# Patient Record
Sex: MALE | Race: BLACK OR AFRICAN AMERICAN | NOT HISPANIC OR LATINO | Employment: UNEMPLOYED | URBAN - METROPOLITAN AREA
[De-identification: names, ages, dates, MRNs, and addresses within clinical notes are randomized per-mention and may not be internally consistent; named-entity substitution may affect disease eponyms.]

---

## 2017-02-06 PROCEDURE — 26750 TREAT FINGER FRACTURE EACH: CPT | Mod: F5

## 2017-02-06 PROCEDURE — 99283 EMERGENCY DEPT VISIT LOW MDM: CPT | Mod: 25

## 2017-02-07 ENCOUNTER — HOSPITAL ENCOUNTER (EMERGENCY)
Facility: HOSPITAL | Age: 27
Discharge: HOME | End: 2017-02-07
Attending: EMERGENCY MEDICINE

## 2017-02-07 VITALS
HEART RATE: 76 BPM | RESPIRATION RATE: 18 BRPM | WEIGHT: 167 LBS | TEMPERATURE: 98 F | OXYGEN SATURATION: 99 % | DIASTOLIC BLOOD PRESSURE: 78 MMHG | SYSTOLIC BLOOD PRESSURE: 125 MMHG

## 2017-02-07 DIAGNOSIS — M79.644 PAIN OF RIGHT THUMB: ICD-10-CM

## 2017-02-07 DIAGNOSIS — S62.524A CLOSED NONDISPLACED FRACTURE OF DISTAL PHALANX OF RIGHT THUMB, INITIAL ENCOUNTER: Primary | ICD-10-CM

## 2017-02-07 DIAGNOSIS — S60.011A CONTUSION OF RIGHT THUMB WITHOUT DAMAGE TO NAIL, INITIAL ENCOUNTER: ICD-10-CM

## 2017-02-07 PROCEDURE — 25000003 PHARM REV CODE 250: Performed by: NURSE PRACTITIONER

## 2017-02-07 RX ORDER — IBUPROFEN 200 MG
600 TABLET ORAL
Status: COMPLETED | OUTPATIENT
Start: 2017-02-07 | End: 2017-02-07

## 2017-02-07 RX ORDER — ACETAMINOPHEN AND CODEINE PHOSPHATE 300; 30 MG/1; MG/1
1-2 TABLET ORAL EVERY 6 HOURS PRN
Qty: 15 TABLET | Refills: 0 | Status: SHIPPED | OUTPATIENT
Start: 2017-02-07 | End: 2017-02-17

## 2017-02-07 RX ORDER — IBUPROFEN 600 MG/1
600 TABLET ORAL EVERY 6 HOURS PRN
Qty: 30 TABLET | Refills: 0 | Status: SHIPPED | OUTPATIENT
Start: 2017-02-07

## 2017-02-07 RX ADMIN — IBUPROFEN 600 MG: 200 TABLET, FILM COATED ORAL at 01:02

## 2017-02-07 NOTE — DISCHARGE INSTRUCTIONS
Finger Contusion  You have a contusion. This is also called a bruise. There is swelling and some bleeding under the skin, but no broken bones. This injury generally takes a few days to a few weeks to heal. During that time, the bruise will typically change in color from reddish, to purple-blue, to greenish-yellow, then to yellow-brown.  A finger contusion may be treated with a splint or nayla tape (taping the injured finger to the one next to it for support). Minor contusions likely will need no other treatment.  Home care  · Elevate the hand to reduce pain and swelling. As much as possible, sit or lie down with the hand raised about the level of your heart. This is especially important during the first 48 hours.  · Ice the finger to help reduce pain and swelling. Wrap a cold source (ice pack or ice cubes in a plastic bag) in a thin towel. Apply to the bruised finger for 20 minutes every 1 to 2 hours the first day. Continue this 3 to 4 times a day until the pain and swelling goes away.  · If nayla tape was applied and it becomes wet or dirty, change it. You may replace it with paper, plastic, or cloth tape. Before taping, put a thin strip of cotton or gauze between the fingers to absorb sweat.  · Unless another medication was prescribed, you can take acetaminophen, ibuprofen, or naproxen to control pain. (If you have chronic liver or kidney disease or ever had a stomach ulcer or GI bleeding, talk with your doctor before using these medicines.)  Follow up  Follow up with your healthcare provider or our staff as advised. Call if you are not improving within 1 to 2 weeks.  When to seek medical advice   Call your healthcare provider right away if you have any of the following:  · Increased pain or swelling  · Hand or arm becomes cold, blue, numb or tingly  · Signs of infection: Warmth, drainage, or increased redness or pain around the bruise  · Inability to move the injured finger or hand   · Frequent bruising for  unknown reasons            Closed Thumb Fracture  You have a broken (fractured) thumb. This causes local pain, swelling, and sometimes bruising. This injury will take about 4 to 6 weeks to heal. Thumb fractures may be treated with a splint or cast. This protects the thumb and holds the bone in place while it heals. More serious fractures may need surgery.    If the thumbnail has been severely injured, it may fall off in 1 to 2 weeks. A new thumbnail will usually start to grow back within a month.  Home care  Follow these guidelines when caring for yourself at home:  · Keep your arm elevated to reduce pain and swelling. When sitting or lying down elevate your arm above the level of your heart. You can do this by placing your arm on a pillow that rests on your chest or on a pillow at your side. This is most important during the first 2 days (48 hours) after the injury.  · Put an ice pack on the injured area. Do this for 20 minutes every 1 to 2 hours the first day for pain relief. You can make an ice pack by wrapping a plastic bag of ice cubes in a thin towel. As the ice melts, be careful that the cast or splint doesnt get wet. Continue using the ice pack 3 to 4 times a day for the next 2 days. Then use the ice pack as needed to ease pain and swelling.  · If a splint was put on, leave this in place for the time advised. This will keep the bones from moving out of position.  · Keep the cast or splint completely dry at all times. Bathe with your cast or splint out of the water. Protect it with a large plastic bag, rubber-banded at the top end. If a fiberglass cast or splint gets wet, you can dry it with a hair dryer.  · You may use acetaminophen or ibuprofen to control pain, unless another pain medicine was prescribed. If you have chronic liver or kidney disease, talk with your health care provider before using these medicines. Also talk with your provider if youve had a stomach ulcer or GI bleeding.  · Dont put  creams or objects under the cast if you have itching.  Follow-up care  Follow up with your health care provider in 1 week, or as advised. This is to make sure the bone is healing the way it should. Talk with your provider about when it is safe to return to sports or work.  If X-rays were taken, a radiologist will look at them. You will be told of any new findings that may affect your care.  When to seek medical advice  Call your health care provider right away if any of these occur:  · The cast cracks  · The plaster cast or splint becomes wet or soft  · The fiberglass cast or splint stays wet for more than 24 hours  · Bad odor from the cast or wound fluid stains the cast  · Pain or swelling gets worse  · Redness or warmth in the hand  · Fingers or hand become cold, blue, numb, or tingly  · You cant move your hand or fingers  · Skin around cast becomes red

## 2017-02-07 NOTE — ED PROVIDER NOTES
Encounter Date: 2/6/2017       History     Chief Complaint   Patient presents with    Hand Injury     pt reports closing R thumb in car door 2 days ago. still having pain and swelling     Review of patient's allergies indicates:  No Known Allergies    Patient is a 26 y.o. male presenting with the following complaint: hand injury. The history is provided by the patient.   Hand Injury    The incident occurred two days ago. The incident occurred at home. Injury mechanism: patient states that he slammed his right thumb into the car door two days ago. The pain is present in the right fingers (right thumb). The quality of the pain is described as aching and throbbing. The pain is at a severity of 7/10. The pain has been fluctuating since the incident. Pertinent negatives include no fever. The symptoms are aggravated by movement, use and palpation. He has tried rest for the symptoms. The treatment provided no relief.       PCP:  Alvino Jerez MD        History reviewed. No pertinent past medical history.  No past medical history pertinent negatives.  History reviewed. No pertinent past surgical history.  History reviewed. No pertinent family history.     Social History   Substance Use Topics    Smoking status: Current Every Day Smoker     Packs/day: 0.50    Smokeless tobacco: Never Used    Alcohol use No     Review of Systems   Constitutional: Negative for chills and fever.   HENT: Negative for congestion and sore throat.    Eyes: Negative for visual disturbance.   Respiratory: Negative for chest tightness and shortness of breath.    Cardiovascular: Negative for chest pain.   Gastrointestinal: Negative for abdominal pain, diarrhea, nausea and vomiting.   Genitourinary: Negative for dysuria.   Musculoskeletal: Negative for back pain and neck pain.        Positive for pain and swelling to right thumb.   Skin: Negative for rash and wound.   Neurological: Negative for dizziness, weakness, numbness and headaches.    Hematological: Does not bruise/bleed easily.       Physical Exam   Initial Vitals   BP Pulse Resp Temp SpO2   02/06/17 2325 02/06/17 2325 02/06/17 2325 02/06/17 2325 02/06/17 2325   128/84 80 20 98.1 °F (36.7 °C) 99 %     Physical Exam    Nursing note and vitals reviewed.  Constitutional: Vital signs are normal. He appears well-developed and well-nourished. He is cooperative. He does not appear ill. No distress.   HENT:   Head: Normocephalic and atraumatic.   Nose: Nose normal.   Mouth/Throat: Uvula is midline, oropharynx is clear and moist and mucous membranes are normal.   Eyes: Conjunctivae, EOM and lids are normal. Pupils are equal, round, and reactive to light.   Neck: Trachea normal and normal range of motion. Neck supple.   Cardiovascular: Normal rate, regular rhythm, intact distal pulses and normal pulses.   Pulmonary/Chest: Effort normal. No respiratory distress.   Musculoskeletal: Normal range of motion. He exhibits no edema.        Right hand: He exhibits tenderness, bony tenderness (to distal aspect of the right thumb) and swelling.        Hands:  Neurological: He is alert and oriented to person, place, and time. He has normal strength. No cranial nerve deficit or sensory deficit. GCS eye subscore is 4. GCS verbal subscore is 5. GCS motor subscore is 6.   Neurovascular intact to all extremities.    Skin: Skin is warm, dry and intact. Bruising (to distal aspect of the right thumb) noted. No rash noted.   Psychiatric: He has a normal mood and affect. His speech is normal and behavior is normal. Judgment and thought content normal. Cognition and memory are normal.         ED Course   Splint Application  Date/Time: 2/7/2017 1:45 AM  Performed by: MARY CARMEN PRYOR  Authorized by: MARY CRAMEN PRYOR   Consent Done: Yes  Consent: Verbal consent obtained.  Risks and benefits: risks, benefits and alternatives were discussed  Consent given by: patient  Patient understanding: patient states understanding  of the procedure being performed  Imaging studies: imaging studies available  Patient identity confirmed: SUDARSHAN, EMERSON, name and verbally with patient  Location details: right thumb  Splint type: static finger  Supplies used: aluminum splint  Post-procedure: The splinted body part was neurovascularly unchanged following the procedure.  Patient tolerance: Patient tolerated the procedure well with no immediate complications          ED Imaging Results:   Imaging Results         X-Ray Hand 3 view Right (Final result) Result time:  17 08:12:45    Final result by MALIK Ortiz Sr., MD (17 08:12:45)    Impression:         There is an acute appearing fracture in the distal aspect of the distal phalanx of the right thumb.      Electronically signed by: MALIK ORTIZ MD  Date:     17  Time:    08:12     Narrative:    3 view x-ray of the right hand    Clinical History:     right thumb pain    Findings: There is an acute appearing fracture in the distal aspect of the distal phalanx of the right thumb.  There is no dislocation.              ED Medications:   Medications   ibuprofen tablet 600 mg (600 mg Oral Given 17 0144)       ED Course Vitals  Vitals:    17 2325 17 0157   BP: 128/84 125/78   BP Location: Right arm    Patient Position: Sitting    Pulse: 80 76   Resp: 20 18   Temp: 98.1 °F (36.7 °C)    TempSrc: Oral    SpO2: 99% 99%   Weight: 75.8 kg (167 lb)          0150 HOURS RE-EVALUATION & DISPOSITION:   Reassessment at the time of disposition demonstrates that the patient is resting comfortably in no acute distress.  He has remained hemodynamically stable throughout the entire ED visit and is without objective evidence for acute process requiring urgent intervention or hospitalization. I discussed test results and provided counseling to patient with regard to condition, the treatment plan, specific conditions for return, and the importance of follow up.  Answered questions at this time. The  patient is stable for discharge.            X-Rays:   Independently Interpreted Readings:   Other Readings:  Radiographs of the right hand reveal a nondisplaced fracture of the distal phalanx of the thumb.     Medical Decision Making:   Clinical Tests:   Radiological Study: Ordered and Reviewed                     Clinical Impression:       ICD-10-CM ICD-9-CM   1. Closed nondisplaced fracture of distal phalanx of right thumb, initial encounter S62.524A 816.02   2. Contusion of right thumb without damage to nail, initial encounter S60.011A 923.3   3. Pain of right thumb M79.644 729.5         Disposition:   Disposition: Discharged  Condition: Stable  I discussed with patient that the evaluation in the emergency department does not suggest any emergent or life threatening medical condition requiring immediate intervention beyond what was provided in the ED, and I believe patient is safe for discharge.  Regardless, an unremarkable evaluation in the ED does not preclude the development or presence of a serious of life threatening condition. As such, patient was instructed to return immediately for any worsening or change in current symptoms. I also discussed the results of my evaluation and diagnostics with patient and he concurs with the evaluation and management plan.  Detailed written and verbal instructions provided to patient and he expressed a verbal understanding of the discharge instructions and overall management plan. Reiterated the importance of medication administration and safety and advised patient to follow up with primary care provider in 3-5 days or sooner if needed.  Also advised patient to return to the ER for any complications.     Regarding FRACTURE CARE, I advised patient and guardian that patient should:  expect some discomfort and take medications as prescribed for pain management; keep extremity elevated above the level of the heart to reduce swelling; apply ice bag to outside of splint to help  reduce swelling; and follow up with primary care provider or orthopedic specialist as instructed.  Instructed patient and guardian to keep splint in place to hold fracture in place and prevent further injury and to keep it clean and dry.  Patient and guardian advised to return to the emergency department for any complications (numbness to extremity, lack of circulation, damage to splint, etc).    Regarding CONTUSIONS, I advised patient to: rest the injured area or use it less than usual; apply ice to decrease swelling and pain and help prevent tissue damage; use compression with an elastic bandage to support the area and decrease swelling; elevate injured body part above the level of the heart to help decrease pain and swelling; avoid using massage or massage to acute injuries as it may slow healing of the area;  avoid drinking alcohol as it may slow healing of the injury; and avoid stretching injured muscles. Advised patient to return to the emergency department or contact primary care provider if: having trouble moving injured area; notice tingling or numbness in or near the injured area; extremity below the bruise gets cold or turns pale; a new lump develops in the injured area; symptoms do not improve with treatment after 4 to 5 days; there is any questions or concerns about the condition or treatment plan.         Discharge Medication List as of 2/7/2017  1:23 AM      START taking these medications    Details   acetaminophen-codeine 300-30mg (TYLENOL #3) 300-30 mg Tab Take 1-2 tablets by mouth every 6 (six) hours as needed (Pain)., Starting 2/7/2017, Until Fri 2/17/17, Print      ibuprofen (ADVIL,MOTRIN) 600 MG tablet Take 1 tablet (600 mg total) by mouth every 6 (six) hours as needed for Pain., Starting 2/7/2017, Until Discontinued, Print             Follow-up Information     Follow up with Alvino Jerez MD. Call in 3 days.    Specialty:  Family Medicine    Why:  If symptoms worsen or as needed    Contact  information:    [address] [place] [id number]  [phone]               Carlitos Coyne, JOHN  02/08/17 5181

## 2017-02-07 NOTE — ED AVS SNAPSHOT
[facility]  [address] [address] [address]               Magda Obear   2017 12:59 AM   ED    Description:  Male : 1990   Department:  [facility]           Your Care was Coordinated By:     Provider Role From To    Carlitos Coyne NP Nurse Practitioner 17 [id number] --      Reason for Visit     Hand Injury           Diagnoses this Visit        Comments    Closed nondisplaced fracture of distal phalanx of right thumb, initial encounter    -  Primary     Contusion of right thumb without damage to nail, initial encounter         Pain of right thumb           ED Disposition     ED Disposition Condition Comment    Discharge             To Do List           Follow-up Information     Follow up with Alvino Jerez MD. Call in 3 days.    Specialty:  Family Medicine    Why:  If symptoms worsen or as needed    Contact information:    [address] [place] [address] [id number] [phone]         These Medications        Disp Refills Start End    ibuprofen (ADVIL,MOTRIN) 600 MG tablet 30 tablet 0 2017     Take 1 tablet (600 mg total) by mouth every 6 (six) hours as needed for Pain. - Oral    acetaminophen-codeine 300-30mg (TYLENOL #3) 300-30 mg Tab 15 tablet 0 2017    Take 1-2 tablets by mouth every 6 (six) hours as needed (Pain). - Oral      [facility]     [facility] On Call Nurse Care Line -  Assistance  Registered nurses in the [facility] On Call Center provide clinical advisement, health education, appointment booking, and other advisory services.  Call for this free service at [phone].             Medications           Message regarding Medications     Verify the changes and/or additions to your medication regime listed below are the same as discussed with your clinician today.  If any of these changes or additions are incorrect, please notify your healthcare provider.        START taking these NEW  medications        Refills    ibuprofen (ADVIL,MOTRIN) 600 MG tablet 0    Sig: Take 1 tablet (600 mg total) by mouth every 6 (six) hours as needed for Pain.    Class: Print    Route: Oral    acetaminophen-codeine 300-30mg (TYLENOL #3) 300-30 mg Tab 0    Sig: Take 1-2 tablets by mouth every 6 (six) hours as needed (Pain).    Class: Print    Route: Oral      These medications were administered today        Dose Freq    ibuprofen tablet 600 mg 600 mg ED 1 Time    Sig: Take 3 tablets (600 mg total) by mouth ED 1 Time.    Class: Normal    Route: Oral           Verify that the below list of medications is an accurate representation of the medications you are currently taking.  If none reported, the list may be blank. If incorrect, please contact your healthcare provider. Carry this list with you in case of emergency.           Current Medications     acetaminophen-codeine 300-30mg (TYLENOL #3) 300-30 mg Tab Take 1-2 tablets by mouth every 6 (six) hours as needed (Pain).    ibuprofen (ADVIL,MOTRIN) 600 MG tablet Take 1 tablet (600 mg total) by mouth every 6 (six) hours as needed for Pain.    ibuprofen tablet 600 mg Take 3 tablets (600 mg total) by mouth ED 1 Time.           Clinical Reference Information           Your Vitals Were     BP Pulse Temp Resp Weight SpO2    128/84 (BP Location: Right arm, Patient Position: Sitting) 80 98.1 °F (36.7 °C) (Oral) 20 75.8 kg (167 lb) 99%      Allergies as of 2/7/2017     No Known Allergies      Immunizations Administered on Date of Encounter - 2/7/2017     None      ED Micro, Lab, POCT     None      ED Imaging Orders     Start Ordered       Status Ordering Provider    02/07/17 0005 02/07/17 0004  X-Ray Hand 3 view Right  1 time imaging      In process         Discharge Instructions         Finger Contusion  You have a contusion. This is also called a bruise. There is swelling and some bleeding under the skin, but no broken bones. This injury generally takes a few days to a few weeks  to heal. During that time, the bruise will typically change in color from reddish, to purple-blue, to greenish-yellow, then to yellow-brown.  A finger contusion may be treated with a splint or nayla tape (taping the injured finger to the one next to it for support). Minor contusions likely will need no other treatment.  Home care  · Elevate the hand to reduce pain and swelling. As much as possible, sit or lie down with the hand raised about the level of your heart. This is especially important during the first 48 hours.  · Ice the finger to help reduce pain and swelling. Wrap a cold source (ice pack or ice cubes in a plastic bag) in a thin towel. Apply to the bruised finger for 20 minutes every 1 to 2 hours the first day. Continue this 3 to 4 times a day until the pain and swelling goes away.  · If nayla tape was applied and it becomes wet or dirty, change it. You may replace it with paper, plastic, or cloth tape. Before taping, put a thin strip of cotton or gauze between the fingers to absorb sweat.  · Unless another medication was prescribed, you can take acetaminophen, ibuprofen, or naproxen to control pain. (If you have chronic liver or kidney disease or ever had a stomach ulcer or GI bleeding, talk with your doctor before using these medicines.)  Follow up  Follow up with your healthcare provider or our staff as advised. Call if you are not improving within 1 to 2 weeks.  When to seek medical advice   Call your healthcare provider right away if you have any of the following:  · Increased pain or swelling  · Hand or arm becomes cold, blue, numb or tingly  · Signs of infection: Warmth, drainage, or increased redness or pain around the bruise  · Inability to move the injured finger or hand   · Frequent bruising for unknown reasons            Closed Thumb Fracture  You have a broken (fractured) thumb. This causes local pain, swelling, and sometimes bruising. This injury will take about 4 to 6 weeks to heal. Thumb  fractures may be treated with a splint or cast. This protects the thumb and holds the bone in place while it heals. More serious fractures may need surgery.    If the thumbnail has been severely injured, it may fall off in 1 to 2 weeks. A new thumbnail will usually start to grow back within a month.  Home care  Follow these guidelines when caring for yourself at home:  · Keep your arm elevated to reduce pain and swelling. When sitting or lying down elevate your arm above the level of your heart. You can do this by placing your arm on a pillow that rests on your chest or on a pillow at your side. This is most important during the first 2 days (48 hours) after the injury.  · Put an ice pack on the injured area. Do this for 20 minutes every 1 to 2 hours the first day for pain relief. You can make an ice pack by wrapping a plastic bag of ice cubes in a thin towel. As the ice melts, be careful that the cast or splint doesnt get wet. Continue using the ice pack 3 to 4 times a day for the next 2 days. Then use the ice pack as needed to ease pain and swelling.  · If a splint was put on, leave this in place for the time advised. This will keep the bones from moving out of position.  · Keep the cast or splint completely dry at all times. Bathe with your cast or splint out of the water. Protect it with a large plastic bag, rubber-banded at the top end. If a fiberglass cast or splint gets wet, you can dry it with a hair dryer.  · You may use acetaminophen or ibuprofen to control pain, unless another pain medicine was prescribed. If you have chronic liver or kidney disease, talk with your health care provider before using these medicines. Also talk with your provider if youve had a stomach ulcer or GI bleeding.  · Dont put creams or objects under the cast if you have itching.  Follow-up care  Follow up with your health care provider in 1 week, or as advised. This is to make sure the bone is healing the way it should. Talk  with your provider about when it is safe to return to sports or work.  If X-rays were taken, a radiologist will look at them. You will be told of any new findings that may affect your care.  When to seek medical advice  Call your health care provider right away if any of these occur:  · The cast cracks  · The plaster cast or splint becomes wet or soft  · The fiberglass cast or splint stays wet for more than 24 hours  · Bad odor from the cast or wound fluid stains the cast  · Pain or swelling gets worse  · Redness or warmth in the hand  · Fingers or hand become cold, blue, numb, or tingly  · You cant move your hand or fingers  · Skin around cast becomes red       [facility] Sign-Up     Activating your [facility] account is as easy as 1-2-3!     1) Visit [email or web address], select Sign Up Now, enter this activation code and your date of birth, then select Next.  [email or web address]  Expires: 3/24/2017  1:20 AM      2) Create a username and password to use when you visit [facility] in the future and select a security question in case you lose your password and select Next.    3) Enter your e-mail address and click Sign Up!    Additional Information  If you have questions, please e-mail [email or web address] or call [phone] to talk to our [facility] staff. Remember, [facility] is NOT to be used for urgent needs. For medical emergencies, dial 911.         Smoking Cessation     If you would like to quit smoking:   You may be eligible for free services if you are a [place] resident and started smoking cigarettes before September 1, 1988.  Call the [employer]ess[employer] ([employer]) toll free at (912) 031-3966 or (399) 179-1951.   Call 4-800-QUIT-NOW if you do not meet the above criteria.             [facility] complies with applicable Federal civil rights laws and does not discriminate on the basis of race, color, national origin, age, disability, or sex.        Language Assistance Services     ATTENTION:  Language assistance services are available, free of charge. Please call [phone].      ATENCIÓN: Si habla español, tiene a gomez disposición servicios gratuitos de asistencia lingüística. Llame al [phone].     CHÚ Ý: N?u b?n nói Ti?ng Vi?t, có các d?ch v? h? tr? ngôn ng? mi?n phí dành cho b?n. G?i s? [phone].

## 2023-08-17 ENCOUNTER — HOSPITAL ENCOUNTER (EMERGENCY)
Facility: HOSPITAL | Age: 33
Discharge: PSYCHIATRIC OR SUBSTANCE USE FACILITY | End: 2023-08-17
Attending: EMERGENCY MEDICINE

## 2023-08-17 VITALS
DIASTOLIC BLOOD PRESSURE: 76 MMHG | RESPIRATION RATE: 17 BRPM | BODY MASS INDEX: 24.82 KG/M2 | WEIGHT: 167.56 LBS | TEMPERATURE: 99 F | OXYGEN SATURATION: 100 % | SYSTOLIC BLOOD PRESSURE: 113 MMHG | HEART RATE: 62 BPM | HEIGHT: 69 IN

## 2023-08-17 DIAGNOSIS — S60.221A CONTUSION OF RIGHT HAND, INITIAL ENCOUNTER: ICD-10-CM

## 2023-08-17 DIAGNOSIS — D64.9 ANEMIA, UNSPECIFIED TYPE: Primary | ICD-10-CM

## 2023-08-17 DIAGNOSIS — F19.90 POLYSUBSTANCE USE DISORDER: ICD-10-CM

## 2023-08-17 LAB
ALBUMIN SERPL BCP-MCNC: 3.5 G/DL (ref 3.5–5.2)
ALP SERPL-CCNC: 55 U/L (ref 55–135)
ALT SERPL W/O P-5'-P-CCNC: 20 U/L (ref 10–44)
AMPHET+METHAMPHET UR QL: ABNORMAL
ANION GAP SERPL CALC-SCNC: 7 MMOL/L (ref 8–16)
APAP SERPL-MCNC: <3 UG/ML (ref 10–20)
AST SERPL-CCNC: 36 U/L (ref 10–40)
BARBITURATES UR QL SCN>200 NG/ML: NEGATIVE
BASOPHILS # BLD AUTO: 0.01 K/UL (ref 0–0.2)
BASOPHILS NFR BLD: 0.3 % (ref 0–1.9)
BENZODIAZ UR QL SCN>200 NG/ML: ABNORMAL
BILIRUB SERPL-MCNC: 0.4 MG/DL (ref 0.1–1)
BILIRUB UR QL STRIP: NEGATIVE
BUN SERPL-MCNC: 6 MG/DL (ref 6–20)
BZE UR QL SCN: NEGATIVE
CALCIUM SERPL-MCNC: 8.9 MG/DL (ref 8.7–10.5)
CANNABINOIDS UR QL SCN: ABNORMAL
CHLORIDE SERPL-SCNC: 105 MMOL/L (ref 95–110)
CLARITY UR REFRACT.AUTO: CLEAR
CO2 SERPL-SCNC: 27 MMOL/L (ref 23–29)
COLOR UR AUTO: YELLOW
CREAT SERPL-MCNC: 0.8 MG/DL (ref 0.5–1.4)
CREAT UR-MCNC: 215.1 MG/DL (ref 23–375)
DIFFERENTIAL METHOD: ABNORMAL
EOSINOPHIL # BLD AUTO: 0.1 K/UL (ref 0–0.5)
EOSINOPHIL NFR BLD: 3.7 % (ref 0–8)
ERYTHROCYTE [DISTWIDTH] IN BLOOD BY AUTOMATED COUNT: 11.7 % (ref 11.5–14.5)
EST. GFR  (NO RACE VARIABLE): >60 ML/MIN/1.73 M^2
ETHANOL SERPL-MCNC: <10 MG/DL
GLUCOSE SERPL-MCNC: 81 MG/DL (ref 70–110)
GLUCOSE UR QL STRIP: NEGATIVE
HCT VFR BLD AUTO: 33.6 % (ref 40–54)
HGB BLD-MCNC: 11.1 G/DL (ref 14–18)
HGB UR QL STRIP: NEGATIVE
IMM GRANULOCYTES # BLD AUTO: 0.01 K/UL (ref 0–0.04)
IMM GRANULOCYTES NFR BLD AUTO: 0.3 % (ref 0–0.5)
KETONES UR QL STRIP: NEGATIVE
LEUKOCYTE ESTERASE UR QL STRIP: NEGATIVE
LYMPHOCYTES # BLD AUTO: 1.1 K/UL (ref 1–4.8)
LYMPHOCYTES NFR BLD: 29.8 % (ref 18–48)
MCH RBC QN AUTO: 32.1 PG (ref 27–31)
MCHC RBC AUTO-ENTMCNC: 33 G/DL (ref 32–36)
MCV RBC AUTO: 97 FL (ref 82–98)
METHADONE UR QL SCN>300 NG/ML: NEGATIVE
MONOCYTES # BLD AUTO: 0.5 K/UL (ref 0.3–1)
MONOCYTES NFR BLD: 12.7 % (ref 4–15)
NEUTROPHILS # BLD AUTO: 2 K/UL (ref 1.8–7.7)
NEUTROPHILS NFR BLD: 53.2 % (ref 38–73)
NITRITE UR QL STRIP: NEGATIVE
NRBC BLD-RTO: 0 /100 WBC
OPIATES UR QL SCN: NEGATIVE
PCP UR QL SCN>25 NG/ML: NEGATIVE
PH UR STRIP: 7 [PH] (ref 5–8)
PLATELET # BLD AUTO: 210 K/UL (ref 150–450)
PMV BLD AUTO: 10.7 FL (ref 9.2–12.9)
POTASSIUM SERPL-SCNC: 3.9 MMOL/L (ref 3.5–5.1)
PROT SERPL-MCNC: 6.8 G/DL (ref 6–8.4)
PROT UR QL STRIP: NEGATIVE
RBC # BLD AUTO: 3.46 M/UL (ref 4.6–6.2)
SALICYLATES SERPL-MCNC: <5 MG/DL (ref 15–30)
SODIUM SERPL-SCNC: 139 MMOL/L (ref 136–145)
SP GR UR STRIP: 1.02 (ref 1–1.03)
TOXICOLOGY INFORMATION: ABNORMAL
TSH SERPL DL<=0.005 MIU/L-ACNC: 0.89 UIU/ML (ref 0.4–4)
URN SPEC COLLECT METH UR: NORMAL
UROBILINOGEN UR STRIP-ACNC: <2 EU/DL
WBC # BLD AUTO: 3.79 K/UL (ref 3.9–12.7)

## 2023-08-17 PROCEDURE — 80179 DRUG ASSAY SALICYLATE: CPT | Mod: ER | Performed by: EMERGENCY MEDICINE

## 2023-08-17 PROCEDURE — 80053 COMPREHEN METABOLIC PANEL: CPT | Mod: ER | Performed by: EMERGENCY MEDICINE

## 2023-08-17 PROCEDURE — 80307 DRUG TEST PRSMV CHEM ANLYZR: CPT | Mod: ER | Performed by: EMERGENCY MEDICINE

## 2023-08-17 PROCEDURE — 82077 ASSAY SPEC XCP UR&BREATH IA: CPT | Mod: ER | Performed by: EMERGENCY MEDICINE

## 2023-08-17 PROCEDURE — G0426 INPT/ED TELECONSULT50: HCPCS | Mod: 95,,, | Performed by: PSYCHIATRY & NEUROLOGY

## 2023-08-17 PROCEDURE — 99285 EMERGENCY DEPT VISIT HI MDM: CPT | Mod: ER

## 2023-08-17 PROCEDURE — 84443 ASSAY THYROID STIM HORMONE: CPT | Mod: ER | Performed by: EMERGENCY MEDICINE

## 2023-08-17 PROCEDURE — 80143 DRUG ASSAY ACETAMINOPHEN: CPT | Mod: ER | Performed by: EMERGENCY MEDICINE

## 2023-08-17 PROCEDURE — G0426 PR INPT TELEHEALTH CONSULT 50M: ICD-10-PCS | Mod: 95,,, | Performed by: PSYCHIATRY & NEUROLOGY

## 2023-08-17 PROCEDURE — 85025 COMPLETE CBC W/AUTO DIFF WBC: CPT | Mod: ER | Performed by: EMERGENCY MEDICINE

## 2023-08-17 PROCEDURE — 81003 URINALYSIS AUTO W/O SCOPE: CPT | Mod: ER,59 | Performed by: EMERGENCY MEDICINE

## 2023-08-17 RX ORDER — QUETIAPINE FUMARATE 100 MG/1
100 TABLET, FILM COATED ORAL NIGHTLY
Status: DISCONTINUED | OUTPATIENT
Start: 2023-08-17 | End: 2023-08-17 | Stop reason: HOSPADM

## 2023-08-17 RX ORDER — QUETIAPINE FUMARATE 200 MG/1
200 TABLET, FILM COATED ORAL NIGHTLY
COMMUNITY
Start: 2023-07-26

## 2023-08-17 RX ORDER — BUSPIRONE HYDROCHLORIDE 15 MG/1
15 TABLET ORAL 2 TIMES DAILY
COMMUNITY
Start: 2023-07-26

## 2023-08-17 RX ORDER — IBUPROFEN 200 MG
1 TABLET ORAL DAILY PRN
Status: DISCONTINUED | OUTPATIENT
Start: 2023-08-17 | End: 2023-08-17 | Stop reason: HOSPADM

## 2023-08-17 RX ORDER — OLANZAPINE 5 MG/1
10 TABLET ORAL EVERY 8 HOURS PRN
Status: DISCONTINUED | OUTPATIENT
Start: 2023-08-17 | End: 2023-08-17 | Stop reason: HOSPADM

## 2023-08-17 NOTE — ED PROVIDER NOTES
"Emergency Medicine Provider Note - 8/17/2023        History     Chief Complaint   Patient presents with    Psychiatric Evaluation     Pt states "I needs my medicine filled". [facility] brought pt in for acting out and throwing trash can though a window.                          History of Present Illness   HPI    8/17/2023, 12:43 PM  The history is provided by the  [facility] and patient    Magda Cota is a 33 y.o. male presenting to the ED for   According to the patient, his girlfriend broke up with him 3 weeks ago.  Patient denies any SI, HI.  Does admit to methamphetamine use.  States that he is going through a rough time.  No previous history of inpatient psychiatric care.    Reviewed OPC:       Arrival mode:          PCP: Alvino Jerez MD     Allergies:  Review of patient's allergies indicates:   Allergen Reactions    Naproxen Nausea And Vomiting       Past Medical History:  No past medical history on file.    Past Surgical History:  No past surgical history on file.      Family History:  No family history on file.    Social History:  Social History     Tobacco Use    Smoking status: Every Day     Current packs/day: 0.50     Types: Cigarettes    Smokeless tobacco: Never   Substance and Sexual Activity    Alcohol use: No    Drug use: No    Sexual activity: Yes     Partners: Female       Triage note, Allergies, Past Medical History, Past Surgical History, Family History and Social History reviewed as documented above.     Review of Systems   Review of Systems   Constitutional:  Negative for fever.   HENT:  Negative for sore throat.    Respiratory:  Negative for shortness of breath.    Cardiovascular:  Negative for chest pain.   Gastrointestinal:  Negative for nausea.   Genitourinary:  Negative for dysuria.   Musculoskeletal:  Positive for arthralgias (Right hand). Negative for back pain.   Skin:  Negative for rash.   Neurological:  Negative for weakness.   Hematological:  Does not bruise/bleed easily. " "  Psychiatric/Behavioral:  Positive for behavioral problems.           Physical Exam     Initial Vitals [08/17/23 1250]   BP Pulse Resp Temp SpO2   130/88 86 17 98.6 °F (37 °C) 98 %      MAP       --          Physical Exam    Nursing Notes and Vital Signs Reviewed.  Constitutional: Patient is in no apparent distress. Well-developed and well-nourished.  Head: Atraumatic. Normocephalic.  Eyes: PERRL. EOM intact. Conjunctivae are not pale. No scleral icterus.  ENT: Mucous membranes are moist. Oropharynx is clear and symmetric.    Neck: Supple. Full ROM. No lymphadenopathy.  Cardiovascular: Regular rate. Regular rhythm. No murmurs, rubs, or gallops. Distal pulses are 2+ and symmetric.  Pulmonary/Chest: No respiratory distress. Clear to auscultation bilaterally. No wheezing or rales.  Abdominal: Soft and non-distended.  There is no tenderness.  No rebound, guarding, or rigidity. Good bowel sounds.  Genitourinary: No CVA tenderness  Musculoskeletal: Moves all extremities. No obvious deformities. No edema. No calf tenderness.  Right upper extremity.  There is swelling noted to the 2nd and 3rd metacarpals.  No bony deformity.  No tenderness palpation along the scaphoid bone.  Intact median, ulnar, and radial nerves both motor and sensory  Skin: Warm and dry.  Neurological:  Alert, awake, and appropriate.  Normal speech.  No acute focal neurological deficits are appreciated.  Psychiatric: Normal affect. Good eye contact. Appropriate in content.     ED Course     ED Procedures:  Procedures    ED Vital Signs:  Vitals:    08/17/23 1250   BP: 130/88   Pulse: 86   Resp: 17   Temp: 98.6 °F (37 °C)   TempSrc: Oral   SpO2: 98%   Weight: 76 kg (167 lb 8.8 oz)   Height: 5' 9" (1.753 m)       Abnormal Lab Results:  Labs Reviewed   CBC W/ AUTO DIFFERENTIAL - Abnormal; Notable for the following components:       Result Value    WBC 3.79 (*)     RBC 3.46 (*)     Hemoglobin 11.1 (*)     Hematocrit 33.6 (*)     MCH 32.1 (*)     All other " components within normal limits   COMPREHENSIVE METABOLIC PANEL - Abnormal; Notable for the following components:    Anion Gap 7 (*)     All other components within normal limits   ACETAMINOPHEN LEVEL - Abnormal; Notable for the following components:    Acetaminophen (Tylenol), Serum <3.0 (*)     All other components within normal limits   SALICYLATE LEVEL - Abnormal; Notable for the following components:    Salicylate Lvl <5.0 (*)     All other components within normal limits   DRUG SCREEN PANEL, URINE EMERGENCY - Abnormal; Notable for the following components:    Amphetamine Screen, Ur Presumptive Positive (*)     THC Presumptive Positive (*)     All other components within normal limits    Narrative:     Specimen Source->Urine   TSH   URINALYSIS, REFLEX TO URINE CULTURE    Narrative:     Specimen Source->Urine   ALCOHOL,MEDICAL (ETHANOL)   DRUG SCREEN PANEL, URINE EMERGENCY        All Lab Results:  Results for orders placed or performed during the hospital encounter of 08/17/23   CBC auto differential   Result Value Ref Range    WBC 3.79 (L) 3.90 - 12.70 K/uL    RBC 3.46 (L) 4.60 - 6.20 M/uL    Hemoglobin 11.1 (L) 14.0 - 18.0 g/dL    Hematocrit 33.6 (L) 40.0 - 54.0 %    MCV 97 82 - 98 fL    MCH 32.1 (H) 27.0 - 31.0 pg    MCHC 33.0 32.0 - 36.0 g/dL    RDW 11.7 11.5 - 14.5 %    Platelets 210 150 - 450 K/uL    MPV 10.7 9.2 - 12.9 fL    Immature Granulocytes 0.3 0.0 - 0.5 %    Gran # (ANC) 2.0 1.8 - 7.7 K/uL    Immature Grans (Abs) 0.01 0.00 - 0.04 K/uL    Lymph # 1.1 1.0 - 4.8 K/uL    Mono # 0.5 0.3 - 1.0 K/uL    Eos # 0.1 0.0 - 0.5 K/uL    Baso # 0.01 0.00 - 0.20 K/uL    nRBC 0 0 /100 WBC    Gran % 53.2 38.0 - 73.0 %    Lymph % 29.8 18.0 - 48.0 %    Mono % 12.7 4.0 - 15.0 %    Eosinophil % 3.7 0.0 - 8.0 %    Basophil % 0.3 0.0 - 1.9 %    Differential Method Automated    Comprehensive metabolic panel   Result Value Ref Range    Sodium 139 136 - 145 mmol/L    Potassium 3.9 3.5 - 5.1 mmol/L    Chloride 105 95 - 110  mmol/L    CO2 27 23 - 29 mmol/L    Glucose 81 70 - 110 mg/dL    BUN 6 6 - 20 mg/dL    Creatinine 0.8 0.5 - 1.4 mg/dL    Calcium 8.9 8.7 - 10.5 mg/dL    Total Protein 6.8 6.0 - 8.4 g/dL    Albumin 3.5 3.5 - 5.2 g/dL    Total Bilirubin 0.4 0.1 - 1.0 mg/dL    Alkaline Phosphatase 55 55 - 135 U/L    AST 36 10 - 40 U/L    ALT 20 10 - 44 U/L    eGFR >60.0 >60 mL/min/1.73 m^2    Anion Gap 7 (L) 8 - 16 mmol/L   TSH   Result Value Ref Range    TSH 0.893 0.400 - 4.000 uIU/mL   Urinalysis, Reflex to Urine Culture Urine, Clean Catch    Specimen: Urine   Result Value Ref Range    Specimen UA Urine, Clean Catch     Color, UA Yellow Yellow, Straw, Mimi    Appearance, UA Clear Clear    pH, UA 7.0 5.0 - 8.0    Specific Gravity, UA 1.020 1.005 - 1.030    Protein, UA Negative Negative    Glucose, UA Negative Negative    Ketones, UA Negative Negative    Bilirubin (UA) Negative Negative    Occult Blood UA Negative Negative    Nitrite, UA Negative Negative    Urobilinogen, UA <2.0 <2.0 EU/dL    Leukocytes, UA Negative Negative   Ethanol   Result Value Ref Range    Alcohol, Serum <10 <10 mg/dL   Acetaminophen level   Result Value Ref Range    Acetaminophen (Tylenol), Serum <3.0 (L) 10.0 - 20.0 ug/mL   Salicylate level   Result Value Ref Range    Salicylate Lvl <5.0 (L) 15.0 - 30.0 mg/dL   Drug screen panel, in-house   Result Value Ref Range    Benzodiazepines Presumptve Positive Negative    Methadone metabolites Negative Negative    Cocaine (Metab.) Negative Negative    Opiate Scrn, Ur Negative Negative    Barbiturate Screen, Ur Negative Negative    Amphetamine Screen, Ur Presumptive Positive (A) Negative    THC Presumptive Positive (A) Negative    Phencyclidine Negative Negative    Creatinine, Urine 215.1 23.0 - 375.0 mg/dL    Toxicology Information SEE COMMENT          Imaging Results:  Imaging Results              X-Ray Hand 3 view Right (Final result)  Result time 08/17/23 13:36:18      Final result by Catherine Mooney MD (Timothy)  (08/17/23 13:36:18)                   Impression:      Negative exam.      Electronically signed by: Catherine Mooney MD  Date:    08/17/2023  Time:    13:36               Narrative:    EXAMINATION:  XR HAND COMPLETE 3 VIEW RIGHT    CLINICAL HISTORY:  Right hand pain;    TECHNIQUE:  Standard radiography performed.  Three views.    COMPARISON:  Right hand, 02/07/2017.    FINDINGS:  Bone density and architecture are normal.  No acute findings.                        Wet Read by Hailey Esquivel DO (08/17/23 13:20:10, [facility] - Emergency Dept, Emergency Medicine)    No fracture                                            The Emergency Provider reviewed the vital signs and test results, which are outlined above.     ED Discussion     ED Course as of 08/17/23 1541   Thu Aug 17, 2023   1328 PEC placedf   [LB]   1448 Amphetamine Screen, Ur(!): Presumptive Positive [LB]   1448 Marijuana (THC) Metabolite(!): Presumptive Positive [LB]   1448 Hemoglobin(!): 11.1 [LB]   1448 Hematocrit(!): 33.6 [LB]   1526 Discussed with Dr. Shaji Ching (Psychiatry):  He recommends inpatient medical treatment.  [LB]      ED Course User Index  [LB] Hailey Esquivel DO     Reviewed recommendations per psychiatry:  RECOMMENDATIONS       - With reasonable medical certainty, based on a present-state examination and information from the OPC (unable to reach the patient's mother despite multiple attempts), the patient currently meets PEC criteria due to being an imminent threat to self and gravely disabled 2/2 mental illness at this time.       - Once medically cleared, seek inpatient psychiatric admission for further mental health treatment / stabilization.       - Restart Seroquel at 100 mg PO QHS for mood / psychosis / sleep / anxiety (discussed risks/benefits/alt vs no treatment with patient).     - Defer other scheduled psychiatric medication(s) to the inpatient psychiatric treatment team (discussed risks/benefits/alt vs no treatment with  patient).     - Defer non-psychiatric medications / management to the ED MD.       - Can use Zyprexa 10 mg PO/IM q8 hours PRN for non-redirectable psychotic / manic agitation (discussed risks/benefits/alt vs no treatment with patient).       - Psychotherapy was performed with patient as noted above with a focus on improving depression with SI, anxiety, sleep, paranoia, and polysubstance cessation / total sobriety.       - Patient's most recent resulted labs / studies / imaging were reviewed today ; UDS positive for amphetamines, benzos, and THC ; Ethanol < 10 ; TSH wnl        - Continue suicide / violence / withdrawal precautions and continue to monitor the patient with a sitter while on a PEC / CEC.       - Thank you for this consult      3:37 PM Patient is medically clear for transfer to psychiatric facility.    3:37 PM  Patient under PEC.     All historical, clinical, radiographic, and laboratory findings were reviewed with the patient/family in detail.  I discussed the indications and treatment need (inpatient psychiatric care) for transfer  to an outside facility secondary to no inpatient psychiatric services offered at this facility.   Patient/family verbalized understanding of the plan of care.   All remaining questions and concerns were addressed at that time and the patient/family agrees to proceed accordingly.  Patient will be transferred by [facility]  (with individuals trained in CPR and CPI) secondary to a need for ongoing personal protection en route.  Risks:    loss of vitals signs, permanent neurologic damage, MVC, resulting in death or loss of neurologic function.  Benefits of transfer: Inpatient psychiatric care.  Patient/family agree and verbalized understanding of the plan of care.     Accepting Facility: Awaiting accepting Facility.  Accepting Physician: Awaiting accepting Physician.         6:20 PM Accepted by Dr. Pepper at [facility].     Hailey Esquivel, DO      ED  "Medication(s):  Medications   QUEtiapine tablet 100 mg (has no administration in time range)   OLANZapine tablet 10 mg (has no administration in time range)   nicotine 21 mg/24 hr 1 patch (has no administration in time range)         Medical Decision Making   Medical Decision Making  Patient with behavioral issues.  Polysubstance abuse.  Punched window with hand.    Differential diagnosis includes:  Polysubstance abuse, right hand contusion, fracture, drug-induced psychosis    Patient had blood work obtained.    White cell count mildly decreased at 3.79, anemia noted with hemoglobin of 11.1/33.6, urine drug screen positive for opioids and amphetamines, right hand x-ray negative for fracture.  Salicylates, alcohol, Tylenol undetectable.    Amount and/or Complexity of Data Reviewed  Labs: ordered. Decision-making details documented in ED Course.  Radiology: ordered and independent interpretation performed. Decision-making details documented in ED Course.    Risk  OTC drugs.  Prescription drug management.  Decision regarding hospitalization.          Discussed case with:Psychiatry            MIPS Measures     Smoker? Yes     Hypertension: Pre-hypertension/Hypertension: The pt has been informed that they may have pre-hypertension or hypertension based on a blood pressure reading in the ED. I recommend that the pt call the PCP listed on their discharge instructions or a physician of their choice this week to arrange f/u for further evaluation of possible pre-hypertension or hypertension.       Portions of this note may have been created with voice recognition software. Occasional "wrong-word" or "sound-a-like" substitutions may have occurred due to the inherent limitations of voice recognition software. Please, read the note carefully and recognize, using context, where substitutions have occurred.            Clinical Impression       ICD-10-CM ICD-9-CM   1. Anemia, unspecified type  D64.9 285.9   2. Contusion of right " hand, initial encounter  S60.221A 923.20   3. Polysubstance use disorder  F19.90 305.90         ED Disposition       Disposition: Transfer to Mental Health Facility  Patient condition: Stable                   Hailey Esquivel,   08/17/23 1541       Hailey Esquivel,   08/17/23 1828

## 2023-08-17 NOTE — ED NOTES
Janiya from [facility] states pt. Accepted to facility, will notify transfer center.

## 2023-08-17 NOTE — ED NOTES
Pt belongings are black cell phone, blue and red shoes, blue shirt, grey sweater, blue and grey socks, black scarf, black shorts, grey shorts, and multi color scarf

## 2023-08-17 NOTE — CONSULTS
[facility]  Psychiatry  Telepsychiatry Consult Note        Patient agreeable to consultation via telepsychiatry.    Tele-Consultation from Psychiatry started: 8/17/2023 at 2:05 PM  The chief complaint leading to psychiatric consultation is: Behavioral Issues and SI (brought to ED on OPC)  This consultation was requested by Hailey Esquivel DO, the Emergency Department attending physician.  The location of the consulting psychiatrist is [place], [place].  The patient location is  [facility] EMERGENCY DEPARTMENT   The patient arrived at the ED at: on 08/17/2023    Also present with the patient at the time of the consultation: nurse / tech     Patient Identification:   Magda Cota is a 33 y.o. male.    Patient information was obtained from patient, parent, past medical records, law enforcement, and ED MD.  Patient presented involuntarily to the Emergency Department on an OPC.      Inpatient consult to Telemedicine - Psyc  Consult performed by: Shaji Ching MD  Consult ordered by: Hailey Esquivel DO        Consult Start Time: 08/17/2023 14:05 CDT  Consult End Time: 08/17/2023 15:25 CDT        HISTORY    Per ED MD:  Magda Cota is a 33 y.o. male presenting to the ED for   According to the patient, his girlfriend broke up with him 3 weeks ago.  Patient denies any SI, HI.  Does admit to methamphetamine use.  States that he is going through a rough time.  No previous history of inpatient psychiatric care.      Chief Complaint / Reason for Psychiatry Consult: Behavioral Issues and SI (brought to ED on OPC)      HPI:   Magda Cota is a 33 y.o. male with no pertinent past medical history and a past psychiatric history of Bipolar Disorder vs SIMD / SIPD, Anxiety, Depression, Insomnia, ADHD, and Polysubstance Abuse, currently in the ED as noted above on an OPC.  Psychiatry was originally consulted as noted above.  The patient was seen and examined.  The chart was reviewed.  On examination today, the patient  was alert and oriented to person, place, city, state, month, year, and situation.  He was CAM-ICU negative for delirium.  He endorses worsening depression symptoms over the past 3 weeks in the context of a break up with his girlfriend as well as medication non-compliance for the past week (typically takes Seroquel 200 mg PO QHS and unknown regimen of Buspar).  He endorses current low mood, low motivation, anergia, anhedonia, irritability, not sleeping in 2-3 days, poor appetite, and feelings of hopelessness / worthlessness / helplessness with passive SI (denies specific plan) (see detailed psych ROS below for current symptoms of depression and anxiety).  He denies any current or recent active or passive HI.  He endorses paranoid delusional thought content that people are out to get him and always talking about him.  He denies current / recent AH, VH, TH, or mariana s/s.  He denies current or prior s/s consistent with panic, OCD, PTSD, or eating disorders.  Regarding current medical/physical complaints, he endorses fatigue and R hand pain.  He denies any other medical complaints at this time.  NAD was observed during the examination.  He objectively appears depressed and guarded / paranoid on exam.  See detailed psych ROS below.  Psychotherapy was implemented as noted below with a focus on improving depression with SI, anxiety, sleep, paranoia, and polysubstance cessation / total sobriety.  See A/P below.       Collateral:  Attempted but was unable to reach the patient's mother at the number listed on the OPC (see copy of OPC in ED MD note).      Psychiatric Review Of Systems - Currently, the patient is endorsing and/or denying the following:  (patient's endorsements are BOLDED below; if not BOLDED, then patient denied):    Endorses Symptoms of Depression: diminished mood, low motivation, loss of interest/anhedonia, irritability, diminished energy, change in sleep, change in appetite, diminished concentration or  cognition or indecisiveness, PMR, excessive feelings of guilt, hopelessness, worthlessness, and passive suicidal ideations (denies plan)    Endorses intermittent trouble with Sleep: initiation, maintenance, early morning awakening with inability to return to sleep    Denies Homicidal ideations: active/passive ideations, organized plans, future intentions    Endorses Symptoms of psychosis: paranoia, hallucinations, delusions, disorganized thinking, disorganized behavior or abnormal motor behavior, or negative symptoms (diminshed emotional expression, avolition, anhedonia, alogia, asociality)     Denies Symptoms of mariana or hypomania: elevated, expansive, or irritable mood with increased energy or activity; with inflated self-esteem or grandiosity, decreased need for sleep, increased rate of speech, FOI or racing thoughts, distractibility, increased goal directed activity or PMA, risky/disinhibited behavior    Endorses intermittent Symptoms of Anxiety: excessive anxiety/worry/fear, more days than not, about numerous issues, difficult to control, with restlessness, fatigue, poor concentration, irritability, muscle tension, sleep disturbance; causes functionally impairing distress     Denies Symptoms of Panic Disorder: recurrent panic attacks, precipitated or un-precipitated, source of worry and/or behavioral changes secondary; with or without agoraphobia    Denies Symptoms of PTSD: h/o trauma; re-experiencing/intrusive symptoms, avoidant behavior, negative alterations in cognition or mood, or hyperarousal symptoms; with or without dissociative symptoms     Denies Symptoms of OCD: obsessions or compulsions     Denies Symptoms of Eating Disorders: anorexia, bulimia or binging    Endorses Substance Use (crystal meth, MDMA, and cannabis): intoxication, withdrawal, tolerance, used in larger amounts or duration than intended, unsuccessful attempts to limit or quit, increased time engaging in or seeking out, cravings or  strong desire to use, failure to fulfill obligations, negative consequences in social/interpersonal/occupational,/recreational areas, use in dangerous situations, medical or psychological consequences       [facility] Toolkit ASQ Suicide Screening Tool:  In the past few weeks, have you wished you were dead? YES   In the past few weeks, have you felt that you or your family would be better off if you were dead? YES  In the past week, have you been having thoughts about killing yourself? Passive SI   Have you ever tried to kill yourself? Denies  Are you having thoughts of killing yourself right now? Passive SI       PSYCHOTHERAPY ADD-ON +[id number]   30 (16-37*) minutes    Time: 18 minutes  Participants: Met with patient    Therapeutic Intervention Type: behavior modifying psychotherapy, supportive psychotherapy  Why chosen therapy is appropriate versus another modality: relevant to diagnosis, patient responds to this modality, evidence based practice    Target symptoms: depression with SI, anxiety, insomnia, paranoia, and polysubstance abuse / dependence   Primary focus: improving depression with SI, anxiety, sleep, paranoia, and polysubstance cessation / total sobriety   Psychotherapeutic techniques: supportive and psychodynamic techniques; psycho-education; deep breathing exercises; motivational interviewing; reality / insight orientation; CBT; problem solving techniques and managing life stressors    Outcome monitoring methods: self-report, observation    Patient's response to intervention:  The patient's response to intervention is accepting / guarded.    Progress toward goals:  The patient's progress toward goals is fair / limited.      ROS:  General ROS: negative for - chills, fever or night sweats; positive for fatigue  Ophthalmic ROS: negative for - blurry vision, double vision or eye pain  ENT ROS: negative for - sinus pain, headaches, sore throat or visual changes  Allergy and Immunology ROS: negative for - hives,  "itchy/watery eyes or nasal congestion  Hematological and Lymphatic ROS: negative for - bleeding problems, bruising, jaundice or pallor  Endocrine ROS: negative for - galactorrhea, hot flashes, mood swings, palpitations or temperature intolerance  Respiratory ROS: negative for - cough, hemoptysis, shortness of breath, tachypnea or wheezing  Cardiovascular ROS: negative for - chest pain, dyspnea on exertion, loss of consciousness, palpitations, rapid heart rate or shortness of breath  Gastrointestinal ROS: negative for - appetite loss, nausea, abdominal pain, blood in stools, change in bowel habits, constipation or diarrhea  Genito-Urinary ROS: negative for - incontinence, nocturia or pelvic pain  Musculoskeletal ROS: negative for - joint stiffness, joint swelling, or muscle pain; positive for R hand pain    Neurological ROS: negative for - behavioral changes, confusion, dizziness, memory loss, numbness/tingling or seizures  Dermatological ROS: negative for dry skin, hair changes, pruritus or rash  Psychiatric ROS: see detailed psychiatric ROS above in history section       Past Psychiatric History:  Previous Medication Trials: yes   Previous Psychiatric Hospitalizations: denies   Previous Suicide Attempts: denies   History of Violence: denies  Outpatient Psychiatrist: sees unknown provider at [facility] in [place], [place]   Hx of Depression: yes  Hx of Anxiety: yes   Hx of PTSD: denies   Hx of Marisol: yes but unclear if this has occurred outside the context of stimulant intoxication   Hx of Psychosis: yes but unclear if this has occurred outside the context of stimulant intoxication     Social History:  Marital Status: recent break up 3 weeks ago   Children: 0   Employment Status/Info: currently unemployed  Education: high school diploma/GED  Special Ed: denies   : denies  Catholic: Congregational   Housing Status: lives with mother in [place], [place]   Hobbies/Leisure time: "not much"  History of " phys/sexual abuse: denies  Access to gun: denies     Family Psychiatric History: denies     Substance Abuse History:  Recreational Drugs: multi-year hx of MDMA, cannabis, and crystal meth abuse (counseled on cessation)   Use of Alcohol: denies  Rehab History: yes  Tobacco Use: 1/2 PPD (counseled on cessation)   Social History     Tobacco Use   Smoking Status Every Day    Current packs/day: 0.50    Types: Cigarettes   Smokeless Tobacco Never   Use of Caffeine: denies  Use of OTC: denies  Legal consequences of chemical use: yes    Legal History:  Past Charges/Incarcerations: yes  Pending charges: denies      Psychosocial Stressors: recent break up with girlfriend, polysubstance abuse / dependence, and medication non-compliance (off Seroquel and Buspar for past week)  Functioning Relationships: good support system in brother and mother   Strengths AND Liabilities: Strength: Patient accepts guidance/feedback, Strength: Patient is physically healthy., Strength: Patient has positive support network., Liability: Patient has poor judgment, Liability: Patient is unstable., Liability: Patient lacks coping skills.      PAST MEDICAL & SURGICAL HISTORY   No past medical history on file.  No past surgical history on file.    NEUROLOGIC HISTORY  Seizures: denies    Head trauma with LOC: denies    CVA: denies     FAMILY HISTORY   No family history on file.    ALLERGIES   Review of patient's allergies indicates:   Allergen Reactions    Naproxen Nausea And Vomiting       CURRENT MEDICATION REGIMEN   Home Meds:   Prior to Admission medications    Medication Sig Start Date End Date Taking? Authorizing Provider   busPIRone (BUSPAR) 15 MG tablet Take 15 mg by mouth 2 (two) times daily. 7/26/23  Yes Provider, Historical   QUEtiapine (SEROQUEL) 200 MG Tab Take 200 mg by mouth every evening. 7/26/23  Yes Provider, Historical   ibuprofen (ADVIL,MOTRIN) 600 MG tablet Take 1 tablet (600 mg total) by mouth every 6 (six) hours as needed for  "Pain. 2/7/17   Carlitos Coyne, NP       OTC Meds: denies     Scheduled Meds:    PRN Meds:    Psychotherapeutics (From admission, onward)      None            LABORATORY DATA   Recent Results (from the past 72 hour(s))   Urinalysis, Reflex to Urine Culture Urine, Clean Catch    Collection Time: 08/17/23  1:30 PM    Specimen: Urine   Result Value Ref Range    Specimen UA Urine, Clean Catch     Color, UA Yellow Yellow, Straw, Mimi    Appearance, UA Clear Clear    pH, UA 7.0 5.0 - 8.0    Specific Gravity, UA 1.020 1.005 - 1.030    Protein, UA Negative Negative    Glucose, UA Negative Negative    Ketones, UA Negative Negative    Bilirubin (UA) Negative Negative    Occult Blood UA Negative Negative    Nitrite, UA Negative Negative    Urobilinogen, UA <2.0 <2.0 EU/dL    Leukocytes, UA Negative Negative   CBC auto differential    Collection Time: 08/17/23  2:12 PM   Result Value Ref Range    WBC 3.79 (L) 3.90 - 12.70 K/uL    RBC 3.46 (L) 4.60 - 6.20 M/uL    Hemoglobin 11.1 (L) 14.0 - 18.0 g/dL    Hematocrit 33.6 (L) 40.0 - 54.0 %    MCV 97 82 - 98 fL    MCH 32.1 (H) 27.0 - 31.0 pg    MCHC 33.0 32.0 - 36.0 g/dL    RDW 11.7 11.5 - 14.5 %    Platelets 210 150 - 450 K/uL    MPV 10.7 9.2 - 12.9 fL    Immature Granulocytes 0.3 0.0 - 0.5 %    Gran # (ANC) 2.0 1.8 - 7.7 K/uL    Immature Grans (Abs) 0.01 0.00 - 0.04 K/uL    Lymph # 1.1 1.0 - 4.8 K/uL    Mono # 0.5 0.3 - 1.0 K/uL    Eos # 0.1 0.0 - 0.5 K/uL    Baso # 0.01 0.00 - 0.20 K/uL    nRBC 0 0 /100 WBC    Gran % 53.2 38.0 - 73.0 %    Lymph % 29.8 18.0 - 48.0 %    Mono % 12.7 4.0 - 15.0 %    Eosinophil % 3.7 0.0 - 8.0 %    Basophil % 0.3 0.0 - 1.9 %    Differential Method Automated       No results found for: "PHENYTOIN", "PHENOBARB", "VALPROATE", "CBMZ"      EXAMINATION    VITALS   Vitals:    08/17/23 1250   BP: 130/88   BP Location: Right arm   Patient Position: Sitting   Pulse: 86   Resp: 17   Temp: 98.6 °F (37 °C)   TempSrc: Oral   SpO2: 98%   Weight: 76 kg (167 lb " "8.8 oz)   Height: 5' 9" (1.753 m)        CONSTITUTIONAL  General Appearance: NAD, unremarkable, age appropriate, normal weight, seated on bed, calm, guarded, increasingly irritable as exam went on     MUSCULOSKELETAL  Muscle Strength and Tone: WNL    Abnormal Involuntary Movements: none observed   Gait and Station: WNL; non-ataxic     PSYCHIATRIC   Behavior/Cooperation:  cooperative, reluctant to participate, psychomotor retardation, eye contact minimal, guarded   Speech:  normal rate, normal pitch, soft, monotone, paucity   Language: grossly intact, able to name, able to repeat with spontaneous speech  Mood:  "not good ; I've been going through some things"  Affect:  congruent with mood and blunted / increasingly irritable as exam went on   Associations: intact; no CORRINE  Thought Process: Linear   Thought Content: + passive SI, + paranoia ; denies HI, AH, VH, TH (no RIS observed)  Sensorium:  Awake  Alert and Oriented: to person, place, city, state, month, year, and situation   Memory: 3/3 immediate, 2/3 at 5 minutes    Recent: Intact; able to report recent events   Remote: Limited / Intact; Named 3/4 past presidents   Attention/concentration: Limited / Intact. Able to spell [id number] but NOT [id number]-[id number].   Similarities: Intact (difference between apple and orange?)  Abstract reasoning: Limited   Fund of Knowledge: Named 3/4 past presidents  Insight: Impaired / Limited  Judgment: Impaired / Limited    CAM ICU Delirium Assessment - NEGATIVE    Is the patient aware of the biomedical complications associated with substance abuse and mental illness? yes        MEDICAL DECISION MAKING    ASSESSMENT        Unspecified Depressive Disorder with Suicidal Ideation   Unspecified Psychosis (paranoia as noted in HPI)  Unspecified Anxiety Disorder   Unspecified Insomnia   Polysubstance Abuse (crystal meth, MDMA, and cannabis)  Psychiatric Examination Requested by Authority (OPC)   (Rule out SIMD / SIPD)  Hx of Bipolar Disorder dx "        RECOMMENDATIONS       - With reasonable medical certainty, based on a present-state examination and information from the OPC (unable to reach the patient's mother despite multiple attempts), the patient currently meets PEC criteria due to being an imminent threat to self and gravely disabled 2/2 mental illness at this time.      - Once medically cleared, seek inpatient psychiatric admission for further mental health treatment / stabilization.      - Restart Seroquel at 100 mg PO QHS for mood / psychosis / sleep / anxiety (discussed risks/benefits/alt vs no treatment with patient).     - Defer other scheduled psychiatric medication(s) to the inpatient psychiatric treatment team (discussed risks/benefits/alt vs no treatment with patient).    - Defer non-psychiatric medications / management to the ED MD.      - Can use Zyprexa 10 mg PO/IM q8 hours PRN for non-redirectable psychotic / manic agitation (discussed risks/benefits/alt vs no treatment with patient).       - Psychotherapy was performed with patient as noted above with a focus on improving depression with SI, anxiety, sleep, paranoia, and polysubstance cessation / total sobriety.      - Patient's most recent resulted labs / studies / imaging were reviewed today ; UDS positive for amphetamines, benzos, and THC ; Ethanol < 10 ; TSH wnl        - Continue suicide / violence / withdrawal precautions and continue to monitor the patient with a sitter while on a PEC / CEC.       - Thank you for this consult         Total time spent with patient face-to-face and/or managing/coordinating patient's care today (excluding the time spent on psychotherapy): 62 minutes   Time spent on psychotherapy today (as noted above): 18 minutes   Total time for encounter today including psychotherapy: 80 minutes      More than 50% of the time was spent counseling/coordinating care.     Consulting clinician was informed of the encounter and consult note.     Consultation ended:  8/17/2023 at 3:25 PM       STAFF:  Shaji Ching MD  [facility]   8/17/2023

## 2024-01-20 ENCOUNTER — HOSPITAL ENCOUNTER (EMERGENCY)
Facility: HOSPITAL | Age: 34
Discharge: HOME | End: 2024-01-21
Attending: EMERGENCY MEDICINE
Payer: MEDICAID

## 2024-01-20 DIAGNOSIS — S01.81XA FACIAL LACERATION, INITIAL ENCOUNTER: ICD-10-CM

## 2024-01-20 DIAGNOSIS — G89.11 ACUTE PAIN OF RIGHT SHOULDER DUE TO TRAUMA: ICD-10-CM

## 2024-01-20 DIAGNOSIS — S01.01XA OCCIPITAL SCALP LACERATION, INITIAL ENCOUNTER: ICD-10-CM

## 2024-01-20 DIAGNOSIS — M25.511 ACUTE PAIN OF RIGHT SHOULDER DUE TO TRAUMA: ICD-10-CM
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
## 2024-01-20 DIAGNOSIS — S02.671A: Primary | ICD-10-CM

## 2024-01-20 PROCEDURE — 99284 EMERGENCY DEPT VISIT MOD MDM: CPT | Mod: ER

## 2024-01-20 RX ORDER — HYDROCODONE BITARTRATE AND ACETAMINOPHEN 5; 325 MG/1; MG/1
1 TABLET ORAL
Status: COMPLETED | OUTPATIENT
Start: 2024-01-21 | End: 2024-01-21

## 2024-01-21 VITALS
BODY MASS INDEX: 27.11 KG/M2 | HEIGHT: 69 IN | HEART RATE: 103 BPM | WEIGHT: 183 LBS | TEMPERATURE: 99 F | DIASTOLIC BLOOD PRESSURE: 84 MMHG | RESPIRATION RATE: 20 BRPM | SYSTOLIC BLOOD PRESSURE: 138 MMHG | OXYGEN SATURATION: 100 %

## 2024-01-21 PROCEDURE — 25000003 PHARM REV CODE 250: Mod: ER | Performed by: EMERGENCY MEDICINE

## 2024-01-21 PROCEDURE — 12001 RPR S/N/AX/GEN/TRNK 2.5CM/<: CPT | Mod: 59,ER

## 2024-01-21 PROCEDURE — 12011 RPR F/E/E/N/L/M 2.5 CM/<: CPT | Mod: ER

## 2024-01-21 RX ORDER — HYDROCODONE BITARTRATE AND ACETAMINOPHEN 5; 325 MG/1; MG/1
1 TABLET ORAL EVERY 4 HOURS PRN
Qty: 15 TABLET | Refills: 0 | Status: SHIPPED | OUTPATIENT
Start: 2024-01-21 | End: 2024-01-26

## 2024-01-21 RX ORDER — AMOXICILLIN AND CLAVULANATE POTASSIUM 875; 125 MG/1; MG/1
1 TABLET, FILM COATED ORAL 2 TIMES DAILY
Qty: 14 TABLET | Refills: 0 | Status: SHIPPED | OUTPATIENT
Start: 2024-01-21 | End: 2024-01-28

## 2024-01-21 RX ADMIN — HYDROCODONE BITARTRATE AND ACETAMINOPHEN 1 TABLET: 5; 325 TABLET ORAL at 12:01
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
## 2024-05-20 ENCOUNTER — HOSPITAL ENCOUNTER (INPATIENT)
Facility: HOSPITAL | Age: 34
LOS: 2 days | Discharge: HOME | DRG: 812 | End: 2024-05-22
Attending: EMERGENCY MEDICINE | Admitting: INTERNAL MEDICINE
Payer: MEDICAID

## 2024-05-20 DIAGNOSIS — F31.9 BIPOLAR 1 DISORDER: ICD-10-CM

## 2024-05-20 DIAGNOSIS — R07.9 CHEST PAIN: ICD-10-CM

## 2024-05-20 DIAGNOSIS — R55 NEAR SYNCOPE: ICD-10-CM

## 2024-05-20 DIAGNOSIS — D64.9 ANEMIA, UNSPECIFIED TYPE: Primary | ICD-10-CM

## 2024-05-20 DIAGNOSIS — Z72.0 TOBACCO ABUSE: ICD-10-CM

## 2024-05-20 LAB
ALBUMIN SERPL BCP-MCNC: 2.9 G/DL (ref 3.5–5.2)
ALP SERPL-CCNC: 39 U/L (ref 55–135)
ALT SERPL W/O P-5'-P-CCNC: 13 U/L (ref 10–44)
ANION GAP SERPL CALC-SCNC: 9 MMOL/L (ref 8–16)
ANISOCYTOSIS BLD QL SMEAR: SLIGHT
AST SERPL-CCNC: 17 U/L (ref 10–40)
BASOPHILS # BLD AUTO: 0.02 K/UL (ref 0–0.2)
BASOPHILS NFR BLD: 0.2 % (ref 0–1.9)
BILIRUB SERPL-MCNC: 0.2 MG/DL (ref 0.1–1)
BNP SERPL-MCNC: <10 PG/ML (ref 0–99)
BUN SERPL-MCNC: 34 MG/DL (ref 6–20)
CALCIUM SERPL-MCNC: 7.9 MG/DL (ref 8.7–10.5)
CHLORIDE SERPL-SCNC: 108 MMOL/L (ref 95–110)
CO2 SERPL-SCNC: 23 MMOL/L (ref 23–29)
CREAT SERPL-MCNC: 0.9 MG/DL (ref 0.5–1.4)
DACRYOCYTES BLD QL SMEAR: ABNORMAL
DIFFERENTIAL METHOD BLD: ABNORMAL
EOSINOPHIL # BLD AUTO: 0 K/UL (ref 0–0.5)
EOSINOPHIL NFR BLD: 0.2 % (ref 0–8)
ERYTHROCYTE [DISTWIDTH] IN BLOOD BY AUTOMATED COUNT: 11.8 % (ref 11.5–14.5)
EST. GFR  (NO RACE VARIABLE): >60 ML/MIN/1.73 M^2
GLUCOSE SERPL-MCNC: 96 MG/DL (ref 70–110)
HCT VFR BLD AUTO: 17.5 % (ref 40–54)
HGB BLD-MCNC: 5.7 G/DL (ref 14–18)
HYPOCHROMIA BLD QL SMEAR: ABNORMAL
IMM GRANULOCYTES # BLD AUTO: 0.09 K/UL (ref 0–0.04)
IMM GRANULOCYTES NFR BLD AUTO: 0.7 % (ref 0–0.5)
LYMPHOCYTES # BLD AUTO: 1.7 K/UL (ref 1–4.8)
LYMPHOCYTES NFR BLD: 13.1 % (ref 18–48)
MCH RBC QN AUTO: 32 PG (ref 27–31)
MCHC RBC AUTO-ENTMCNC: 32.6 G/DL (ref 32–36)
MCV RBC AUTO: 98 FL (ref 82–98)
MONOCYTES # BLD AUTO: 0.9 K/UL (ref 0.3–1)
MONOCYTES NFR BLD: 6.5 % (ref 4–15)
NEUTROPHILS # BLD AUTO: 10.4 K/UL (ref 1.8–7.7)
NEUTROPHILS NFR BLD: 79.3 % (ref 38–73)
NRBC BLD-RTO: 0 /100 WBC
OB PNL STL: NEGATIVE
PLATELET # BLD AUTO: 180 K/UL (ref 150–450)
PMV BLD AUTO: 11.6 FL (ref 9.2–12.9)
POCT GLUCOSE: 104 MG/DL (ref 70–110)
POIKILOCYTOSIS BLD QL SMEAR: SLIGHT
POLYCHROMASIA BLD QL SMEAR: ABNORMAL
POTASSIUM SERPL-SCNC: 3.6 MMOL/L (ref 3.5–5.1)
PROT SERPL-MCNC: 5.1 G/DL (ref 6–8.4)
RBC # BLD AUTO: 1.78 M/UL (ref 4.6–6.2)
SODIUM SERPL-SCNC: 140 MMOL/L (ref 136–145)
STOMATOCYTES BLD QL SMEAR: PRESENT
TROPONIN I SERPL DL<=0.01 NG/ML-MCNC: <0.006 NG/ML (ref 0–0.03)
WBC # BLD AUTO: 13.15 K/UL (ref 3.9–12.7)

## 2024-05-20 PROCEDURE — 63600175 PHARM REV CODE 636 W HCPCS: Mod: ER | Performed by: EMERGENCY MEDICINE

## 2024-05-20 PROCEDURE — 93005 ELECTROCARDIOGRAM TRACING: CPT | Mod: ER

## 2024-05-20 PROCEDURE — 21400001 HC TELEMETRY ROOM

## 2024-05-20 PROCEDURE — 80053 COMPREHEN METABOLIC PANEL: CPT | Mod: ER | Performed by: EMERGENCY MEDICINE

## 2024-05-20 PROCEDURE — 96361 HYDRATE IV INFUSION ADD-ON: CPT | Mod: ER

## 2024-05-20 PROCEDURE — 99285 EMERGENCY DEPT VISIT HI MDM: CPT | Mod: 25,ER

## 2024-05-20 PROCEDURE — 96374 THER/PROPH/DIAG INJ IV PUSH: CPT | Mod: ER

## 2024-05-20 PROCEDURE — 93010 ELECTROCARDIOGRAM REPORT: CPT | Mod: ,,, | Performed by: INTERNAL MEDICINE

## 2024-05-20 PROCEDURE — 83880 ASSAY OF NATRIURETIC PEPTIDE: CPT | Mod: ER | Performed by: EMERGENCY MEDICINE

## 2024-05-20 PROCEDURE — 82272 OCCULT BLD FECES 1-3 TESTS: CPT | Mod: ER | Performed by: EMERGENCY MEDICINE

## 2024-05-20 PROCEDURE — 85025 COMPLETE CBC W/AUTO DIFF WBC: CPT | Mod: ER | Performed by: EMERGENCY MEDICINE

## 2024-05-20 PROCEDURE — C9113 INJ PANTOPRAZOLE SODIUM, VIA: HCPCS | Mod: ER | Performed by: EMERGENCY MEDICINE

## 2024-05-20 PROCEDURE — 25000003 PHARM REV CODE 250: Mod: ER | Performed by: EMERGENCY MEDICINE

## 2024-05-20 PROCEDURE — 84484 ASSAY OF TROPONIN QUANT: CPT | Mod: ER | Performed by: EMERGENCY MEDICINE

## 2024-05-20 PROCEDURE — 96375 TX/PRO/DX INJ NEW DRUG ADDON: CPT | Mod: ER

## 2024-05-20 RX ORDER — ACETAMINOPHEN 325 MG/1
650 TABLET ORAL
Status: COMPLETED | OUTPATIENT
Start: 2024-05-20 | End: 2024-05-20

## 2024-05-20 RX ORDER — ONDANSETRON HYDROCHLORIDE 2 MG/ML
4 INJECTION, SOLUTION INTRAVENOUS
Status: COMPLETED | OUTPATIENT
Start: 2024-05-20 | End: 2024-05-20

## 2024-05-20 RX ORDER — PANTOPRAZOLE SODIUM 40 MG/10ML
40 INJECTION, POWDER, LYOPHILIZED, FOR SOLUTION INTRAVENOUS
Status: COMPLETED | OUTPATIENT
Start: 2024-05-20 | End: 2024-05-20

## 2024-05-20 RX ORDER — HYDROCODONE BITARTRATE AND ACETAMINOPHEN 500; 5 MG/1; MG/1
TABLET ORAL
Status: DISCONTINUED | OUTPATIENT
Start: 2024-05-20 | End: 2024-05-22 | Stop reason: HOSPADM

## 2024-05-20 RX ADMIN — SODIUM CHLORIDE 1000 ML: 9 INJECTION, SOLUTION INTRAVENOUS at 04:05

## 2024-05-20 RX ADMIN — PANTOPRAZOLE SODIUM 40 MG: 40 INJECTION, POWDER, LYOPHILIZED, FOR SOLUTION INTRAVENOUS at 05:05

## 2024-05-20 RX ADMIN — ACETAMINOPHEN 650 MG: 325 TABLET ORAL at 09:05

## 2024-05-20 RX ADMIN — ONDANSETRON 4 MG: 2 INJECTION INTRAMUSCULAR; INTRAVENOUS at 04:05

## 2024-05-20 NOTE — ED PROVIDER NOTES
Encounter Date: 5/20/2024       History     Chief Complaint   Patient presents with    near syncope     Near syncope at home, , denies pain. AAOx4.        33-year-old male with past medical history of PUD, anxiety and depression presents to the emergency department with complaints of a near syncopal episode.  Patient says last night before he went to sleep he was feeling weak and tired.  When he awoke this morning and tried to get out of his bed, he felt weak like he was going to pass out and had to sit back down.  Patient says he did not pass out but almost did.  Patient says overall he is still feeling very   Generally weak.  He denies any chest pain, fever, chills, nausea, vomiting, diarrhea, black or bloody stool, dysuria, rashes, cough, cold, congestion, blurry vision.    The history is provided by the patient.     Review of patient's allergies indicates:   Allergen Reactions    Naproxen Nausea And Vomiting     No past medical history on file.  No past surgical history on file.  No family history on file.  Social History     Tobacco Use    Smoking status: Every Day     Current packs/day: 0.50     Types: Cigarettes    Smokeless tobacco: Never   Substance Use Topics    Alcohol use: No    Drug use: No     Review of Systems   Constitutional:  Positive for fatigue. Negative for chills and fever.   HENT:  Negative for congestion and dental problem.    Eyes:  Negative for pain and visual disturbance.   Respiratory:  Negative for cough and shortness of breath.    Cardiovascular:  Negative for chest pain and palpitations.   Gastrointestinal:  Negative for abdominal pain, diarrhea, nausea and vomiting.   Genitourinary:  Negative for dysuria and flank pain.   Musculoskeletal:  Negative for back pain and neck pain.   Skin:  Negative for rash and wound.   Neurological:  Positive for syncope. Negative for weakness, numbness and headaches.       Physical Exam     Initial Vitals [05/20/24 1503]   BP Pulse Resp Temp SpO2    99/65 (!) 116 18 98.2 °F (36.8 °C) 100 %      MAP       --         Physical Exam    Constitutional: He appears well-developed and well-nourished. No distress.   HENT:   Head: Normocephalic and atraumatic.   Mouth/Throat: Oropharynx is clear and moist.   Eyes: EOM are normal. Pupils are equal, round, and reactive to light.   Neck: Neck supple.   Normal range of motion.  Cardiovascular:  Regular rhythm.             Tachycardia   Pulmonary/Chest: Breath sounds normal. No respiratory distress.   Abdominal: He exhibits no distension. There is no abdominal tenderness.   Genitourinary:    Genitourinary Comments:  Male chaperone present for exam.  Stool color is orange and loose.  No melena or hematochezia.     Musculoskeletal:         General: No tenderness. Normal range of motion.      Cervical back: Normal range of motion and neck supple.     Neurological: He is alert and oriented to person, place, and time. He has normal strength. No sensory deficit.   Skin: Skin is warm and dry.   Psychiatric: He has a normal mood and affect.         ED Course   Critical Care    Date/Time: 5/20/2024 5:00 PM    Performed by: Jovon Lombardo MD  Authorized by: Jovon Lombardo MD  Direct patient critical care time: 7 minutes  Additional history critical care time: 8 minutes  Ordering / reviewing critical care time: 7 minutes  Documentation critical care time: 6 minutes  Consulting other physicians critical care time: 5 minutes  Other critical care time: 6 minutes  Total critical care time (exclusive of procedural time) : 39 minutes  Critical care time was exclusive of separately billable procedures and treating other patients and teaching time.  Critical care was necessary to treat or prevent imminent or life-threatening deterioration of the following conditions: Anemia.  Critical care was time spent personally by me on the following activities: blood draw for specimens, development of treatment plan with patient or surrogate,  discussions with consultants, interpretation of cardiac output measurements, evaluation of patient's response to treatment, examination of patient, obtaining history from patient or surrogate, ordering and performing treatments and interventions, ordering and review of laboratory studies, ordering and review of radiographic studies, pulse oximetry, re-evaluation of patient's condition and review of old charts.        Labs Reviewed   CBC W/ AUTO DIFFERENTIAL - Abnormal; Notable for the following components:       Result Value    WBC 13.15 (*)     RBC 1.78 (*)     Hemoglobin 5.7 (*)     Hematocrit 17.5 (*)     MCH 32.0 (*)     Immature Granulocytes 0.7 (*)     Gran # (ANC) 10.4 (*)     Immature Grans (Abs) 0.09 (*)     Gran % 79.3 (*)     Lymph % 13.1 (*)     All other components within normal limits    Narrative:     HGB and HCT   critical result(s) called and verbal readback obtained   from Delia Chaidez RN by SED 05/20/2024 16:46   COMPREHENSIVE METABOLIC PANEL - Abnormal; Notable for the following components:    BUN 34 (*)     Calcium 7.9 (*)     Total Protein 5.1 (*)     Albumin 2.9 (*)     Alkaline Phosphatase 39 (*)     All other components within normal limits   B-TYPE NATRIURETIC PEPTIDE   TROPONIN I   OCCULT BLOOD X 1, STOOL   TYPE & SCREEN   POCT GLUCOSE MONITORING CONTINUOUS   PREPARE RBC SOFT     EKG Readings: (Independently Interpreted)    Rate of 111 beats per minute.  Sinus tachycardia.  Normal axis.  P.r., QRS and QTC intervals within normal limits.  No STEMI.     ECG Results              EKG 12-lead (In process)        Collection Time Result Time QRS Duration OHS QTC Calculation    05/20/24 15:07:38 05/20/24 15:38:14 88 424                     In process by Interface, Lab In [facility] (05/20/24 15:38:17)                   Narrative:    Test Reason : R55,    Vent. Rate : 111 BPM     Atrial Rate : 111 BPM     P-R Int : 126 ms          QRS Dur : 088 ms      QT Int : 312 ms       P-R-T Axes : 086 084 073  degrees     QTc Int : 424 ms    Sinus tachycardia  Nonspecific T wave abnormality  Abnormal ECG  No previous ECGs available    Referred By: [email or web address]   SELF           Confirmed By:                                   Imaging Results              X-Ray Chest AP Portable (Final result)  Result time 05/20/24 15:32:44      Final result by Favio Dolan MD (05/20/24 15:32:44)                   Impression:      No acute process seen.      Electronically signed by: Favio Dolan MD  Date:    05/20/2024  Time:    15:32               Narrative:    EXAMINATION:  XR CHEST AP PORTABLE    CLINICAL HISTORY:  Near Syncope;    FINDINGS:  Single view of the chest.  Comparison none    Cardiac silhouette is normal.  The lungs demonstrate no evidence of active disease.  No evidence of pleural effusion or pneumothorax.  Bones appear intact.                                       Medications   0.9%  NaCl infusion (for blood administration) (has no administration in time range)   sodium chloride 0.9% bolus 1,000 mL 1,000 mL (0 mLs Intravenous Stopped 5/20/24 1712)   ondansetron injection 4 mg (4 mg Intravenous Given 5/20/24 1612)   pantoprazole injection 40 mg (40 mg Intravenous Given 5/20/24 1711)     Medical Decision Making   Differential diagnosis includes GIB, vasovagal syncope, medication side effect, dehydration, volume depletion, electrolyte abnormality, arrhythmia    Amount and/or Complexity of Data Reviewed  Labs: ordered.  Radiology: ordered.    Risk  Prescription drug management.  Decision regarding hospitalization.               ED Course as of 05/20/24 1930   Mon May 20, 2024   1730 All historical, clinical, radiographic, and laboratory findings were reviewed with the patient/family in detail along with the indications for transport to the facility in [place] in order to receive hospitalist consultation and blood transfusion.  All remaining questions and concerns were addressed at this time and the patient/family  communicates understanding and agrees to proceed accordingly.  Similarly, all pertinent details of the encounter were discussed with Dr. Russell who agrees to receive the patient at [facility] for further care as outlined above.  The patient will be transferred by [facility] ambulance services secondary to a need for ongoing cardiac monitoring en route.  Jovon Lombardo MD  5:30 PM       [BA]      ED Course User Index  [BA] Jovon Lombardo MD                           Clinical Impression:  Final diagnoses:  [R55] Near syncope  [D64.9] Anemia, unspecified type (Primary)          ED Disposition Condition    Admit Stable                Jovon Lombardo MD  05/20/24 6388

## 2024-05-21 PROBLEM — F31.9 BIPOLAR 1 DISORDER: Status: ACTIVE | Noted: 2024-05-21

## 2024-05-21 PROBLEM — R55 NEAR SYNCOPE: Status: ACTIVE | Noted: 2024-05-21

## 2024-05-21 PROBLEM — Z87.11 HISTORY OF PEPTIC ULCER DISEASE: Status: ACTIVE | Noted: 2024-05-21

## 2024-05-21 PROBLEM — D64.9 SYMPTOMATIC ANEMIA: Status: ACTIVE | Noted: 2024-05-21

## 2024-05-21 PROBLEM — Z72.0 TOBACCO ABUSE: Status: ACTIVE | Noted: 2024-05-21

## 2024-05-21 LAB
ABO + RH BLD: NORMAL
ALBUMIN SERPL BCP-MCNC: 2.8 G/DL (ref 3.5–5.2)
ALBUMIN SERPL BCP-MCNC: 2.9 G/DL (ref 3.5–5.2)
ALP SERPL-CCNC: 42 U/L (ref 55–135)
ALT SERPL W/O P-5'-P-CCNC: 9 U/L (ref 10–44)
AMPHET+METHAMPHET UR QL: ABNORMAL
ANION GAP SERPL CALC-SCNC: 6 MMOL/L (ref 8–16)
ANION GAP SERPL CALC-SCNC: 8 MMOL/L (ref 8–16)
APTT PPP: 23.1 SEC (ref 21–32)
AST SERPL-CCNC: 13 U/L (ref 10–40)
BARBITURATES UR QL SCN>200 NG/ML: NEGATIVE
BASOPHILS # BLD AUTO: 0.02 K/UL (ref 0–0.2)
BASOPHILS # BLD AUTO: 0.03 K/UL (ref 0–0.2)
BASOPHILS NFR BLD: 0.2 % (ref 0–1.9)
BASOPHILS NFR BLD: 0.3 % (ref 0–1.9)
BENZODIAZ UR QL SCN>200 NG/ML: ABNORMAL
BILIRUB SERPL-MCNC: 0.5 MG/DL (ref 0.1–1)
BILIRUB UR QL STRIP: NEGATIVE
BLD GP AB SCN CELLS X3 SERPL QL: NORMAL
BLD PROD TYP BPU: NORMAL
BLOOD UNIT EXPIRATION DATE: NORMAL
BLOOD UNIT TYPE CODE: 600
BLOOD UNIT TYPE: NORMAL
BUN SERPL-MCNC: 22 MG/DL (ref 6–20)
BUN SERPL-MCNC: 26 MG/DL (ref 6–20)
BZE UR QL SCN: NEGATIVE
CALCIUM SERPL-MCNC: 7.7 MG/DL (ref 8.7–10.5)
CALCIUM SERPL-MCNC: 7.9 MG/DL (ref 8.7–10.5)
CANNABINOIDS UR QL SCN: ABNORMAL
CHLORIDE SERPL-SCNC: 109 MMOL/L (ref 95–110)
CHLORIDE SERPL-SCNC: 110 MMOL/L (ref 95–110)
CLARITY UR: CLEAR
CO2 SERPL-SCNC: 22 MMOL/L (ref 23–29)
CO2 SERPL-SCNC: 23 MMOL/L (ref 23–29)
CODING SYSTEM: NORMAL
COLOR UR: YELLOW
CREAT SERPL-MCNC: 0.8 MG/DL (ref 0.5–1.4)
CREAT SERPL-MCNC: 0.9 MG/DL (ref 0.5–1.4)
CREAT UR-MCNC: 154 MG/DL (ref 23–375)
CROSSMATCH INTERPRETATION: NORMAL
DIFFERENTIAL METHOD BLD: ABNORMAL
DIFFERENTIAL METHOD BLD: ABNORMAL
DISPENSE STATUS: NORMAL
EOSINOPHIL # BLD AUTO: 0 K/UL (ref 0–0.5)
EOSINOPHIL # BLD AUTO: 0 K/UL (ref 0–0.5)
EOSINOPHIL NFR BLD: 0.4 % (ref 0–8)
EOSINOPHIL NFR BLD: 0.4 % (ref 0–8)
ERYTHROCYTE [DISTWIDTH] IN BLOOD BY AUTOMATED COUNT: 11.5 % (ref 11.5–14.5)
ERYTHROCYTE [DISTWIDTH] IN BLOOD BY AUTOMATED COUNT: 12.1 % (ref 11.5–14.5)
EST. GFR  (NO RACE VARIABLE): >60 ML/MIN/1.73 M^2
EST. GFR  (NO RACE VARIABLE): >60 ML/MIN/1.73 M^2
ETHANOL UR-MCNC: <10 MG/DL
FERRITIN SERPL-MCNC: 58 NG/ML (ref 20–300)
FOLATE SERPL-MCNC: 5.7 NG/ML (ref 4–24)
GLUCOSE SERPL-MCNC: 88 MG/DL (ref 70–110)
GLUCOSE SERPL-MCNC: 90 MG/DL (ref 70–110)
GLUCOSE UR QL STRIP: NEGATIVE
HCT VFR BLD AUTO: 15 % (ref 40–54)
HCT VFR BLD AUTO: 17.5 % (ref 40–54)
HCT VFR BLD AUTO: 23.4 % (ref 40–54)
HGB BLD-MCNC: 5 G/DL (ref 14–18)
HGB BLD-MCNC: 5.8 G/DL (ref 14–18)
HGB BLD-MCNC: 7.9 G/DL (ref 14–18)
HGB UR QL STRIP: NEGATIVE
HIV 1+2 AB+HIV1 P24 AG SERPL QL IA: NEGATIVE
IMM GRANULOCYTES # BLD AUTO: 0.04 K/UL (ref 0–0.04)
IMM GRANULOCYTES # BLD AUTO: 0.05 K/UL (ref 0–0.04)
IMM GRANULOCYTES NFR BLD AUTO: 0.5 % (ref 0–0.5)
IMM GRANULOCYTES NFR BLD AUTO: 0.5 % (ref 0–0.5)
INR PPP: 1 (ref 0.8–1.2)
IRON SERPL-MCNC: 66 UG/DL (ref 45–160)
KETONES UR QL STRIP: ABNORMAL
LEUKOCYTE ESTERASE UR QL STRIP: NEGATIVE
LYMPHOCYTES # BLD AUTO: 2 K/UL (ref 1–4.8)
LYMPHOCYTES # BLD AUTO: 3 K/UL (ref 1–4.8)
LYMPHOCYTES NFR BLD: 25.2 % (ref 18–48)
LYMPHOCYTES NFR BLD: 29.2 % (ref 18–48)
MCH RBC QN AUTO: 31.8 PG (ref 27–31)
MCH RBC QN AUTO: 32.4 PG (ref 27–31)
MCHC RBC AUTO-ENTMCNC: 33.1 G/DL (ref 32–36)
MCHC RBC AUTO-ENTMCNC: 33.3 G/DL (ref 32–36)
MCV RBC AUTO: 96 FL (ref 82–98)
MCV RBC AUTO: 98 FL (ref 82–98)
METHADONE UR QL SCN>300 NG/ML: NEGATIVE
MONOCYTES # BLD AUTO: 0.9 K/UL (ref 0.3–1)
MONOCYTES # BLD AUTO: 1.1 K/UL (ref 0.3–1)
MONOCYTES NFR BLD: 10.7 % (ref 4–15)
MONOCYTES NFR BLD: 11.6 % (ref 4–15)
NEUTROPHILS # BLD AUTO: 5 K/UL (ref 1.8–7.7)
NEUTROPHILS # BLD AUTO: 6 K/UL (ref 1.8–7.7)
NEUTROPHILS NFR BLD: 58.9 % (ref 38–73)
NEUTROPHILS NFR BLD: 62.1 % (ref 38–73)
NITRITE UR QL STRIP: NEGATIVE
NRBC BLD-RTO: 1 /100 WBC
NRBC BLD-RTO: 1 /100 WBC
NUM UNITS TRANS PACKED RBC: NORMAL
OHS QRS DURATION: 88 MS
OHS QTC CALCULATION: 424 MS
OPIATES UR QL SCN: ABNORMAL
PCP UR QL SCN>25 NG/ML: NEGATIVE
PH UR STRIP: 6 [PH] (ref 5–8)
PHOSPHATE SERPL-MCNC: 2.4 MG/DL (ref 2.7–4.5)
PLATELET # BLD AUTO: 171 K/UL (ref 150–450)
PLATELET # BLD AUTO: 189 K/UL (ref 150–450)
PMV BLD AUTO: 11.3 FL (ref 9.2–12.9)
PMV BLD AUTO: 11.4 FL (ref 9.2–12.9)
POTASSIUM SERPL-SCNC: 3.3 MMOL/L (ref 3.5–5.1)
POTASSIUM SERPL-SCNC: 3.6 MMOL/L (ref 3.5–5.1)
PROT SERPL-MCNC: 4.6 G/DL (ref 6–8.4)
PROT UR QL STRIP: NEGATIVE
PROTHROMBIN TIME: 11.1 SEC (ref 9–12.5)
RBC # BLD AUTO: 1.57 M/UL (ref 4.6–6.2)
RBC # BLD AUTO: 1.79 M/UL (ref 4.6–6.2)
SATURATED IRON: 26 % (ref 20–50)
SODIUM SERPL-SCNC: 139 MMOL/L (ref 136–145)
SODIUM SERPL-SCNC: 139 MMOL/L (ref 136–145)
SP GR UR STRIP: 1.02 (ref 1–1.03)
SPECIMEN OUTDATE: NORMAL
TOTAL IRON BINDING CAPACITY: 255 UG/DL (ref 250–450)
TOXICOLOGY INFORMATION: ABNORMAL
TRANSFERRIN SERPL-MCNC: 172 MG/DL (ref 200–375)
URN SPEC COLLECT METH UR: ABNORMAL
UROBILINOGEN UR STRIP-ACNC: NEGATIVE EU/DL
WBC # BLD AUTO: 10.12 K/UL (ref 3.9–12.7)
WBC # BLD AUTO: 8.01 K/UL (ref 3.9–12.7)

## 2024-05-21 PROCEDURE — 82728 ASSAY OF FERRITIN: CPT | Performed by: NURSE PRACTITIONER

## 2024-05-21 PROCEDURE — 85610 PROTHROMBIN TIME: CPT | Performed by: NURSE PRACTITIONER

## 2024-05-21 PROCEDURE — C9113 INJ PANTOPRAZOLE SODIUM, VIA: HCPCS | Performed by: NURSE PRACTITIONER

## 2024-05-21 PROCEDURE — 85730 THROMBOPLASTIN TIME PARTIAL: CPT | Performed by: NURSE PRACTITIONER

## 2024-05-21 PROCEDURE — P9016 RBC LEUKOCYTES REDUCED: HCPCS | Performed by: INTERNAL MEDICINE

## 2024-05-21 PROCEDURE — 25000003 PHARM REV CODE 250

## 2024-05-21 PROCEDURE — 83921 ORGANIC ACID SINGLE QUANT: CPT | Performed by: NURSE PRACTITIONER

## 2024-05-21 PROCEDURE — 21400001 HC TELEMETRY ROOM

## 2024-05-21 PROCEDURE — P9016 RBC LEUKOCYTES REDUCED: HCPCS | Performed by: NURSE PRACTITIONER

## 2024-05-21 PROCEDURE — 86920 COMPATIBILITY TEST SPIN: CPT | Performed by: NURSE PRACTITIONER

## 2024-05-21 PROCEDURE — 36415 COLL VENOUS BLD VENIPUNCTURE: CPT | Performed by: NURSE PRACTITIONER

## 2024-05-21 PROCEDURE — 82746 ASSAY OF FOLIC ACID SERUM: CPT | Performed by: NURSE PRACTITIONER

## 2024-05-21 PROCEDURE — 25000003 PHARM REV CODE 250: Performed by: NURSE PRACTITIONER

## 2024-05-21 PROCEDURE — 81003 URINALYSIS AUTO W/O SCOPE: CPT | Performed by: NURSE PRACTITIONER

## 2024-05-21 PROCEDURE — 36430 TRANSFUSION BLD/BLD COMPNT: CPT

## 2024-05-21 PROCEDURE — 83540 ASSAY OF IRON: CPT | Performed by: NURSE PRACTITIONER

## 2024-05-21 PROCEDURE — 86920 COMPATIBILITY TEST SPIN: CPT | Performed by: INTERNAL MEDICINE

## 2024-05-21 PROCEDURE — 87389 HIV-1 AG W/HIV-1&-2 AB AG IA: CPT | Performed by: NURSE PRACTITIONER

## 2024-05-21 PROCEDURE — 36415 COLL VENOUS BLD VENIPUNCTURE: CPT | Mod: XB | Performed by: INTERNAL MEDICINE

## 2024-05-21 PROCEDURE — 80069 RENAL FUNCTION PANEL: CPT | Performed by: NURSE PRACTITIONER

## 2024-05-21 PROCEDURE — 80307 DRUG TEST PRSMV CHEM ANLYZR: CPT | Performed by: NURSE PRACTITIONER

## 2024-05-21 PROCEDURE — 85025 COMPLETE CBC W/AUTO DIFF WBC: CPT | Mod: 91 | Performed by: NURSE PRACTITIONER

## 2024-05-21 PROCEDURE — 80053 COMPREHEN METABOLIC PANEL: CPT | Performed by: NURSE PRACTITIONER

## 2024-05-21 PROCEDURE — 85014 HEMATOCRIT: CPT | Performed by: INTERNAL MEDICINE

## 2024-05-21 PROCEDURE — 82607 VITAMIN B-12: CPT | Performed by: NURSE PRACTITIONER

## 2024-05-21 PROCEDURE — 30233N1 TRANSFUSION OF NONAUTOLOGOUS RED BLOOD CELLS INTO PERIPHERAL VEIN, PERCUTANEOUS APPROACH: ICD-10-PCS | Performed by: INTERNAL MEDICINE

## 2024-05-21 PROCEDURE — 63600175 PHARM REV CODE 636 W HCPCS: Performed by: NURSE PRACTITIONER

## 2024-05-21 PROCEDURE — 85018 HEMOGLOBIN: CPT | Performed by: INTERNAL MEDICINE

## 2024-05-21 PROCEDURE — 86850 RBC ANTIBODY SCREEN: CPT | Performed by: NURSE PRACTITIONER

## 2024-05-21 RX ORDER — POLYETHYLENE GLYCOL 3350 17 G/17G
17 POWDER, FOR SOLUTION ORAL DAILY PRN
Status: DISCONTINUED | OUTPATIENT
Start: 2024-05-21 | End: 2024-05-22 | Stop reason: HOSPADM

## 2024-05-21 RX ORDER — IBUPROFEN 200 MG
24 TABLET ORAL
Status: DISCONTINUED | OUTPATIENT
Start: 2024-05-21 | End: 2024-05-22 | Stop reason: HOSPADM

## 2024-05-21 RX ORDER — HYDROCODONE BITARTRATE AND ACETAMINOPHEN 500; 5 MG/1; MG/1
TABLET ORAL
Status: DISCONTINUED | OUTPATIENT
Start: 2024-05-21 | End: 2024-05-22 | Stop reason: HOSPADM

## 2024-05-21 RX ORDER — BUTALBITAL, ACETAMINOPHEN AND CAFFEINE 50; 325; 40 MG/1; MG/1; MG/1
1 TABLET ORAL EVERY 6 HOURS PRN
Status: DISCONTINUED | OUTPATIENT
Start: 2024-05-21 | End: 2024-05-22 | Stop reason: HOSPADM

## 2024-05-21 RX ORDER — SODIUM CHLORIDE 9 MG/ML
INJECTION, SOLUTION INTRAVENOUS CONTINUOUS
Status: DISCONTINUED | OUTPATIENT
Start: 2024-05-21 | End: 2024-05-22 | Stop reason: HOSPADM

## 2024-05-21 RX ORDER — SODIUM CHLORIDE 0.9 % (FLUSH) 0.9 %
3 SYRINGE (ML) INJECTION EVERY 8 HOURS PRN
Status: DISCONTINUED | OUTPATIENT
Start: 2024-05-21 | End: 2024-05-22 | Stop reason: HOSPADM

## 2024-05-21 RX ORDER — IBUPROFEN 200 MG
16 TABLET ORAL
Status: DISCONTINUED | OUTPATIENT
Start: 2024-05-21 | End: 2024-05-22 | Stop reason: HOSPADM

## 2024-05-21 RX ORDER — PANTOPRAZOLE SODIUM 40 MG/1
40 TABLET, DELAYED RELEASE ORAL 2 TIMES DAILY
Status: DISCONTINUED | OUTPATIENT
Start: 2024-05-21 | End: 2024-05-22 | Stop reason: HOSPADM

## 2024-05-21 RX ORDER — GLUCAGON 1 MG
1 KIT INJECTION
Status: DISCONTINUED | OUTPATIENT
Start: 2024-05-21 | End: 2024-05-22 | Stop reason: HOSPADM

## 2024-05-21 RX ORDER — NALOXONE HCL 0.4 MG/ML
0.02 VIAL (ML) INJECTION
Status: DISCONTINUED | OUTPATIENT
Start: 2024-05-21 | End: 2024-05-22 | Stop reason: HOSPADM

## 2024-05-21 RX ORDER — ONDANSETRON HYDROCHLORIDE 2 MG/ML
4 INJECTION, SOLUTION INTRAVENOUS EVERY 8 HOURS PRN
Status: DISCONTINUED | OUTPATIENT
Start: 2024-05-21 | End: 2024-05-22 | Stop reason: HOSPADM

## 2024-05-21 RX ORDER — PANTOPRAZOLE SODIUM 40 MG/10ML
40 INJECTION, POWDER, LYOPHILIZED, FOR SOLUTION INTRAVENOUS 2 TIMES DAILY
Status: DISCONTINUED | OUTPATIENT
Start: 2024-05-21 | End: 2024-05-21

## 2024-05-21 RX ADMIN — PANTOPRAZOLE SODIUM 40 MG: 40 INJECTION, POWDER, LYOPHILIZED, FOR SOLUTION INTRAVENOUS at 09:05

## 2024-05-21 RX ADMIN — PANTOPRAZOLE SODIUM 40 MG: 40 TABLET, DELAYED RELEASE ORAL at 08:05

## 2024-05-21 RX ADMIN — BUTALBITAL, ACETAMINOPHEN, AND CAFFEINE 1 TABLET: 325; 50; 40 TABLET ORAL at 08:05

## 2024-05-21 NOTE — HPI
Patient is 33-year-old male with past medical history significant for anxiety, depression, bipolar disorder, H pylori positive peptic ulcer disease (2015), tobacco abuse, polysubstance abuse, and ADHD who presented to [facility] ED with complaints of weakness, fatigue, and near-syncopal episode.  Vital signs while in ED included blood pressure as low as 64/44 (standing),  90s systolic while lying, initially tachycardic to 116 improved with fluids, saturating normally on room air, afebrile.   Lab workup showed WBC 13 K, hemoglobin 5.7, hematocrit 17.5, platelets 180, BUN 34, creatinine 0.9, albumin 2.9, normal LFTs, BNP less than 10, troponin normal.  Chest x-ray normal.  Glucose normal.  His symptoms started on Sunday night, states feeling very weak  and very tired.  He denies chest pain, shortness and breath, headache,  nausea, vomiting, abdominal pain, black or bloody stools, diarrhea, fever, chills, pain, visual changes, dysuria, cough or cold symptoms.  Rectal exam done per ED stool noted to be orange in color, no melena no hematochezia. Patient denies taking any NSAIDs, denies taking any over-the-counter medications denies taking Pepto-Bismol.  He states that he only takes prescription medications.  He denies current drug use, denies drinking alcohol.  He was given 1 L normal saline.  Hospital Medicine team was consulted for admission due to symptomatic anemia requiring blood transfusion.

## 2024-05-21 NOTE — PROGRESS NOTES
[facility] Medicine  Progress Note    Patient Name: Magda Cota  MRN: [id number]  Patient Class: IP- Inpatient   Admission Date: 5/20/2024  Length of Stay: 0 days  Attending Physician: Kera Romero MD  Primary Care Provider: Alvino Jerez MD        Subjective:     Principal Problem:Symptomatic anemia        HPI:  Patient is 33-year-old male with past medical history significant for anxiety, depression, bipolar disorder, H pylori positive peptic ulcer disease (2015), tobacco abuse, polysubstance abuse, and ADHD who presented to [facility] ED with complaints of weakness, fatigue, and near-syncopal episode.  Vital signs while in ED included blood pressure as low as 64/44 (standing),  90s systolic while lying, initially tachycardic to 116 improved with fluids, saturating normally on room air, afebrile.   Lab workup showed WBC 13 K, hemoglobin 5.7, hematocrit 17.5, platelets 180, BUN 34, creatinine 0.9, albumin 2.9, normal LFTs, BNP less than 10, troponin normal.  Chest x-ray normal.  Glucose normal.  His symptoms started on Sunday night, states feeling very weak  and very tired.  He denies chest pain, shortness and breath, headache,  nausea, vomiting, abdominal pain, black or bloody stools, diarrhea, fever, chills, pain, visual changes, dysuria, cough or cold symptoms.  Rectal exam done per ED stool noted to be orange in color, no melena no hematochezia. Patient denies taking any NSAIDs, denies taking any over-the-counter medications denies taking Pepto-Bismol.  He states that he only takes prescription medications.  He denies current drug use, denies drinking alcohol.  He was given 1 L normal saline.  Hospital Medicine team was consulted for admission due to symptomatic anemia requiring blood transfusion.    Overview/Hospital Course:  33 year old male presented to the ED with complaints of weakness, fatigue, and near-syncopal episode.    Labs: H/H 5.8/17.5, ferritin 172,  platelet count 171, BUN 22, Crit 0.8   EKG sinus tachycardia  Hemoccult negative   GI Consulted- Dr. Obrien recommends patient to follow up outpatient with [facility] due to being hemoccult negative.   IV Protonix switch to PO Protonix 40mg BID   H.Pylori stool antigen ordered   Patient received 3 units PRBCs   Will continue to trend hemoglobin and hematocrit in a.m.   Hemoglobin electrophoresis ordered for in the a.m.   Regular diet ordered     Interval History: f/u near syncope. Patient awake and alert. NAD. Patient denies any melena or hematochezia. Patient reports he still feels very dizzy when standing. Patient denies PICA, denies taking any Pepto-Bismol, denies any NSAID use. Patient denies nausea, vomiting, or diarrhea. States last BM was 2 days ago. Will continue to trend h/h post blood transfusion completion today and in a.m. . H. Pylori stool antigen ordered, hemoglobin electrophoresis ordered. Regular diet ordered. Protonix switched to PO     Review of Systems   Constitutional:  Positive for fatigue.   Respiratory:  Positive for shortness of breath (with exertion).    Cardiovascular:  Negative for chest pain.   Gastrointestinal: Negative.  Negative for anal bleeding, blood in stool, constipation, diarrhea, nausea and vomiting.   Genitourinary: Negative.    Skin: Negative.    Neurological:  Positive for dizziness, weakness and light-headedness.     Objective:     Vital Signs (Most Recent):  Temp: 98.9 °F (37.2 °C) (05/21/24 1330)  Pulse: 71 (05/21/24 1452)  Resp: 17 (05/21/24 1330)  BP: 106/66 (05/21/24 1330)  SpO2: 100 % (05/21/24 1330) Vital Signs (24h Range):  Temp:  [97.5 °F (36.4 °C)-99.2 °F (37.3 °C)] 98.9 °F (37.2 °C)  Pulse:  [] 71  Resp:  [14-22] 17  SpO2:  [95 %-100 %] 100 %  BP: ()/(44-66) 106/66     Weight: 82.6 kg (182 lb)  Body mass index is 24.68 kg/m².    Intake/Output Summary (Last 24 hours) at 5/21/2024 1607  Last data filed at 5/21/2024 1324  Gross per 24 hour   Intake 2005.25 ml    Output --   Net 2005.25 ml         Physical Exam  Vitals and nursing note reviewed.   Constitutional:       General: He is not in acute distress.  HENT:      Mouth/Throat:      Mouth: Mucous membranes are dry.      Pharynx: Oropharynx is clear.   Eyes:      Pupils: Pupils are equal, round, and reactive to light.   Cardiovascular:      Rate and Rhythm: Normal rate and regular rhythm.      Heart sounds: No murmur heard.  Pulmonary:      Effort: Pulmonary effort is normal.      Breath sounds: Normal breath sounds. No wheezing.   Abdominal:      General: Bowel sounds are normal. There is no distension.      Palpations: Abdomen is soft.      Tenderness: There is no abdominal tenderness.   Musculoskeletal:         General: Normal range of motion.   Skin:     General: Skin is warm and dry.   Neurological:      General: No focal deficit present.      Mental Status: He is alert and oriented to person, place, and time.      Comments: Speech very low     Psychiatric:         Mood and Affect: Affect is flat.             Significant Labs: All pertinent labs within the past 24 hours have been reviewed.  Recent Lab Results  (Last 5 results in the past 24 hours)        05/21/24  0821   05/21/24  0743   05/21/24  0252   05/21/24  0226   05/20/24  1656        Unit Blood Type Code       0600  [P]                0600  [P]                0600  [P]         Unit Expiration       [id number]  [P]                [id number]  [P]                [id number]  [P]         Unit Blood Type       A NEG  [P]                A NEG  [P]                A NEG  [P]         Albumin   2.8     2.9         ALP   42             ALT   9             Anion Gap   8     6         Appearance, UA Clear               PTT       23.1  Comment: Refer to local heparin nomogram for intensity/dose specific   therapeutic   range.           AST   13             Baso #   0.02     0.03         Basophil %   0.2     0.3         Bilirubin (UA) Negative                BILIRUBIN TOTAL   0.5  Comment: For infants and newborns, interpretation of results should be based  on gestational age, weight and in agreement with clinical  observations.    Premature Infant recommended reference ranges:  Up to 24 hours.............<8.0 mg/dL  Up to 48 hours............<12.0 mg/dL  3-5 days..................<15.0 mg/dL  6-29 days.................<15.0 mg/dL               BUN   22     26         Calcium   7.7     7.9         Chloride   109     110         CO2   22     23         CODING SYSTEM       [id number]  [P]                [id number]  [P]                [id number]  [P]         Color, UA Yellow               Creatinine   0.8     0.9         Crossmatch Interpretation       Compatible  [P]                Compatible  [P]                Compatible  [P]         Differential Method   Automated     Automated         DISPENSE STATUS       ISSUED  [P]                ISSUED  [P]                ISSUED  [P]         eGFR   >60     >60         Eos #   0.0     0.0         Eos %   0.4     0.4         Ferritin     58           Folate     5.7           Glucose   88     90         Glucose, UA Negative               Gran # (ANC)   5.0     6.0         Gran %   62.1     58.9         Group & Rh       A NEG         Hematocrit   17.5  Comment: hh  result(s) called and verbal readback obtained from nuha castillo rn by [facility] 05/21/2024 08:40       15.0  Comment: Results confirmed, test repeated  HGB,HCT  critical result(s) called and verbal readback obtained   from ROXI FONG RN by [place] 05/21/2024 02:46           Hemoglobin   5.8  Comment: hh  result(s) called and verbal readback obtained from nuha castillo rn by [facility] 05/21/2024 08:40       5.0  Comment: Results confirmed, test repeated  HGB,HCT  critical result(s) called and verbal readback obtained   from ROXI FONG RN by [facility] 05/21/2024 02:46           HIV 1/2 Ag/Ab     Negative           Immature Grans (Abs)   0.04  Comment: Mild elevation in immature granulocytes is non  specific and   can be seen in a variety of conditions including stress response,   acute inflammation, trauma and pregnancy. Correlation with other   laboratory and clinical findings is essential.       0.05  Comment: Mild elevation in immature granulocytes is non specific and   can be seen in a variety of conditions including stress response,   acute inflammation, trauma and pregnancy. Correlation with other   laboratory and clinical findings is essential.           Immature Granulocytes   0.5     0.5         INDIRECT ELIZABETH       NEG         INR       1.0  Comment: Coumadin Therapy:  2.0 - 3.0 for INR for all indicators except mechanical heart valves  and antiphospholipid syndromes which should use 2.5 - 3.5.           Iron     66           Ketones, UA 2+               Leukocyte Esterase, UA Negative               Lymph #   2.0     3.0         Lymph %   25.2     29.2         MCH   32.4     31.8         MCHC   33.1     33.3         MCV   98     96         Mono #   0.9     1.1         Mono %   11.6     10.7         MPV   11.4     11.3         NITRITE UA Negative               nRBC   1     1         Occult Blood         Negative       Blood, UA Negative               pH, UA 6.0               Phosphorus Level       2.4         Platelet Count   171     189         Potassium   3.6     3.3         Product Code       [id number]  [P]                [id number]  [P]                [id number]  [P]         PROTEIN TOTAL   4.6             Protein, UA Negative  Comment: Recommend a 24 hour urine protein or a urine   protein/creatinine ratio if globulin induced proteinuria is  clinically suspected.                 PT       11.1         RBC   1.79     1.57         RDW   12.1     11.5         Saturated Iron     26           Sodium   139     139         Specific [place], UA 1.025               Specimen Outdate       05/24/2024 23:59         Specimen UA Urine, Clean Catch               TIBC     255           Transferrin     172            UNIT NUMBER       [id number]  [P]                [id number]  [P]                [id number]  [P]         UROBILINOGEN UA Negative               WBC   8.01     10.12                                 [P] - Preliminary Result               Significant Imaging: I have reviewed all pertinent imaging results/findings within the past 24 hours.    Assessment/Plan:      * Symptomatic anemia  Patient's anemia is currently uncontrolled. Has not received any PRBCs to date. Etiology likely d/t chronic blood loss  Current CBC reviewed-   Lab Results   Component Value Date    HGB 5.8 (LL) 05/21/2024    HCT 17.5 (LL) 05/21/2024     Monitor serial CBC and transfuse if patient becomes hemodynamically unstable, symptomatic or H/H drops below 7/21.    Blood transfusion ordered, 3 units PRBCs   No obvious source of bleeding identified, stool for occult blood negative, hx of peptic ulcer in the past, will consult GI  Denies use of NSAIDs or OTC medications/Pepto Bismol, etc. -- ? however home med list includes ibuprofen 600 mg PRN  Iron studies: Iron 66, TIBC 255, Folate 5.7, Ferritin 58   Will trend h/h 1 hr post transfusion and in a.m   Hemoglobin electrophoresis ordered in a.m.       Tobacco abuse  Counseled on smoking cessation      History of peptic ulcer disease  Treated for H pylori/peptic ulcer disease in 2015, also required blood transfusion then for anemia, thought due to increased NSAIDs  States that he has avoided NSAIDs since that time  Protonix ordered  GI consulted- Dr. Campoverde recommends outpatient follow up with [facility] as patient is hemoccult negative   H pylori stool antigen ordered, pending     Bipolar 1 disorder  Resume home medications upon discharge      Near syncope  +orthostatic -- dropped to 64/44 when standing  Hydrated with fluid bolus  Blood transfusion, 3 units PRBCs today   Will trend h/h 1hr post transfusion and in a.m.         VTE Risk Mitigation (From admission, onward)           Ordered     IP VTE  LOW RISK PATIENT  Once         05/21/24 0203     Place sequential compression device  Until discontinued         05/21/24 0203                    Discharge Planning   PATRICIA:      Code Status: Full Code   Is the patient medically ready for discharge?:     Reason for patient still in hospital (select all that apply): Patient trending condition, Laboratory test, and Treatment  Discharge Plan A: Home with family            The patient's case has been reviewed by my supervising physician.   Supervising physician will provide additional orders and recommendations at his/her discretion.  Please see supervising physicians documentation and/or orders for further details.        Jazmine Philippe NP  Department of Hospital Medicine   [facility] ([facility])

## 2024-05-21 NOTE — ASSESSMENT & PLAN NOTE
Treated for H pylori/peptic ulcer disease in 2015, also required blood transfusion then for anemia, thought due to increased NSAIDs  States that he has avoided NSAIDs since that time  Protonix ordered

## 2024-05-21 NOTE — ASSESSMENT & PLAN NOTE
+orthostatic -- dropped to 64/44 when standing  Hydrated with fluid bolus  Blood transfusion pending

## 2024-05-21 NOTE — PLAN OF CARE
[facility] - Telemetry (Hospital)  Initial Discharge Assessment       Primary Care Provider: Alvino Jerez MD    Admission Diagnosis: Near syncope [R55]  Anemia, unspecified type [D64.9]    Admission Date: 5/20/2024  Expected Discharge Date:     Transition of Care Barriers: (P) None    Payor: MEDICAID / Plan: [facility] [employer] ([place] MEDICAID) / Product Type: Managed Medicaid /     Extended Emergency Contact Information  Primary Emergency Contact: Nichelle Perdomo   [place]  Mobile Phone: [phone]  Relation: Mother  Secondary Emergency Contact: Agustin Schwarz  Mobile Phone: [phone]  Relation: Significant other    Discharge Plan A: (P) Home with family         [facility] [place] - [address] [address] [facility]  [address] [facility]  [place] [address]  Phone: [phone] Fax: [phone]      Initial Assessment (most recent)       Adult Discharge Assessment - 05/21/24 1551          Discharge Assessment    Assessment Type Discharge Planning Assessment (P)      Confirmed/corrected address, phone number and insurance Yes (P)      Source of Information patient (P)      Communicated PATRICIA with patient/caregiver Date not available/Unable to determine (P)      Reason For Admission symptomatic anemia (P)      People in Home significant other (P)      Facility Arrived From: home (P)      Do you expect to return to your current living situation? Yes (P)      Do you have help at home or someone to help you manage your care at home? Yes (P)      Who are your caregiver(s) and their phone number(s)? Agustin Schwarz significant other (P)      Prior to hospitilization cognitive status: Alert/Oriented (P)      Current cognitive status: Alert/Oriented (P)      Walking or Climbing Stairs Difficulty no (P)      Dressing/Bathing Difficulty no (P)      Equipment Currently Used at Home none (P)      Readmission within 30 days? No (P)      Patient currently being followed by outpatient case  management? No (P)      Do you currently have service(s) that help you manage your care at home? No (P)      Do you take prescription medications? Yes (P)      Do you have prescription coverage? Yes (P)      Coverage Medicaid (P)      Do you have any problems affording any of your prescribed medications? No (P)      Is the patient taking medications as prescribed? yes (P)      Who is going to help you get home at discharge? mother (P)      How do you get to doctors appointments? health plan transportation (P)      Are you on dialysis? No (P)      Do you take coumadin? No (P)      Discharge Plan A Home with family (P)      DME Needed Upon Discharge  none (P)      Discharge Plan discussed with: Patient (P)      Transition of Care Barriers None (P)         Housing Stability    In the last 12 months, was there a time when you were not able to pay the mortgage or rent on time? No (P)      At any time in the past 12 months, were you homeless or living in a shelter (including now)? No (P)         Transportation Needs    Has the lack of transportation kept you from medical appointments, meetings, work or from getting things needed for daily living? No (P)         Food Insecurity    Within the past 12 months, you worried that your food would run out before you got the money to buy more. Never true (P)      Within the past 12 months, the food you bought just didn't last and you didn't have money to get more. Never true (P)                       Patient lives with significant other, Junita who can be help at home.  Patient is independent with ADL's.  Currently no needs.

## 2024-05-21 NOTE — ASSESSMENT & PLAN NOTE
+orthostatic -- dropped to 64/44 when standing  Hydrated with fluid bolus  Blood transfusion, 3 units PRBCs today   Will trend h/h 1hr post transfusion and in a.m.

## 2024-05-21 NOTE — SUBJECTIVE & OBJECTIVE
Past Medical History:   Diagnosis Date    ADHD     Anxiety and depression     Bipolar disorder, unspecified     H pylori ulcer     Peptic ulcer     Polysubstance abuse     Tobacco abuse        Past Surgical History:   Procedure Laterality Date    ESOPHAGOGASTRODUODENOSCOPY         Review of patient's allergies indicates:   Allergen Reactions    Naproxen Nausea And Vomiting       No current facility-administered medications on file prior to encounter.     Current Outpatient Medications on File Prior to Encounter   Medication Sig    busPIRone (BUSPAR) 15 MG tablet Take 15 mg by mouth 2 (two) times daily.    ibuprofen (ADVIL,MOTRIN) 600 MG tablet Take 1 tablet (600 mg total) by mouth every 6 (six) hours as needed for Pain.    QUEtiapine (SEROQUEL) 200 MG Tab Take 200 mg by mouth every evening.     Family History       Problem Relation (Age of Onset)    Diabetes Mother    Diabetes Mellitus Father    Hypertension Mother, Father          Tobacco Use    Smoking status: Every Day     Current packs/day: 1.00     Types: Cigarettes    Smokeless tobacco: Never   Substance and Sexual Activity    Alcohol use: No    Drug use: Not Currently    Sexual activity: Yes     Partners: Female     Review of Systems   Constitutional:  Positive for fatigue. Negative for chills, diaphoresis, fever and unexpected weight change.   HENT: Negative.  Negative for congestion, nosebleeds, sore throat and trouble swallowing.    Eyes: Negative.  Negative for visual disturbance.   Respiratory:  Negative for cough and shortness of breath.    Cardiovascular: Negative.  Negative for chest pain, palpitations and leg swelling.   Gastrointestinal: Negative.  Negative for abdominal distention, abdominal pain, anal bleeding, blood in stool, diarrhea, nausea, rectal pain and vomiting.   Endocrine: Negative.    Genitourinary: Negative.    Musculoskeletal: Negative.    Skin: Negative.    Allergic/Immunologic: Negative.    Neurological:  Positive for dizziness,  weakness and light-headedness.        Near-syncope   Psychiatric/Behavioral: Negative.       Objective:     Vital Signs (Most Recent):  Temp: 98.7 °F (37.1 °C) (05/21/24 0134)  Pulse: 90 (05/21/24 0134)  Resp: 17 (05/21/24 0134)  BP: 108/64 (05/21/24 0134)  SpO2: 95 % (05/21/24 0134) Vital Signs (24h Range):  Temp:  [98 °F (36.7 °C)-98.7 °F (37.1 °C)] 98.7 °F (37.1 °C)  Pulse:  [] 90  Resp:  [14-22] 17  SpO2:  [95 %-100 %] 95 %  BP: ()/(44-65) 108/64     Weight: 82.6 kg (182 lb)  Body mass index is 24.68 kg/m².     Physical Exam  Constitutional:       General: He is not in acute distress.     Appearance: He is not ill-appearing, toxic-appearing or diaphoretic.   HENT:      Head: Normocephalic and atraumatic.      Nose: Nose normal.      Mouth/Throat:      Mouth: Mucous membranes are moist.      Pharynx: Oropharynx is clear.   Eyes:      Extraocular Movements: Extraocular movements intact.      Pupils: Pupils are equal, round, and reactive to light.   Cardiovascular:      Rate and Rhythm: Normal rate and regular rhythm.      Pulses: Normal pulses.      Heart sounds: Normal heart sounds.   Pulmonary:      Effort: Pulmonary effort is normal. No respiratory distress.      Breath sounds: Normal breath sounds. No wheezing or rales.   Chest:      Chest wall: No tenderness.   Abdominal:      General: Bowel sounds are normal. There is no distension.      Palpations: Abdomen is soft.      Tenderness: There is no abdominal tenderness. There is no guarding.   Genitourinary:     Rectum: Guaiac result negative.   Musculoskeletal:         General: Normal range of motion.      Cervical back: Neck supple.      Right lower leg: No edema.      Left lower leg: No edema.   Skin:     General: Skin is warm and dry.      Capillary Refill: Capillary refill takes less than 2 seconds.   Neurological:      General: No focal deficit present.      Mental Status: He is alert and oriented to person, place, and time.      Comments:  Speech is very soft   Psychiatric:         Mood and Affect: Mood normal.         Behavior: Behavior normal.              CRANIAL NERVES     CN III, IV, VI   Pupils are equal, round, and reactive to light.       Significant Labs: All pertinent labs within the past 24 hours have been reviewed.  Recent Lab Results         05/20/24  1656   05/20/24  1617   05/20/24  1612        Albumin     2.9       ALP     39       ALT     13       Anion Gap     9       Aniso     Slight       AST     17       Baso #     0.02       Basophil %     0.2       BILIRUBIN TOTAL     0.2  Comment: For infants and newborns, interpretation of results should be based  on gestational age, weight and in agreement with clinical  observations.    Premature Infant recommended reference ranges:  Up to 24 hours.............<8.0 mg/dL  Up to 48 hours............<12.0 mg/dL  3-5 days..................<15.0 mg/dL  6-29 days.................<15.0 mg/dL         BNP     <10  Comment: Values of less than 100 pg/ml are consistent with non-CHF populations.       BUN     34       Calcium     7.9       Chloride     108       CO2     23       Creatinine     0.9       Differential Method     Automated       eGFR     >60.0       Eos #     0.0       Eos %     0.2       Glucose     96       Gran # (ANC)     10.4       Gran %     79.3       Hematocrit     17.5  Comment: HGB and HCT   critical result(s) called and verbal readback obtained   from Delia Chaidez RN by SED 05/20/2024 16:46         Hemoglobin     5.7  Comment: HGB and HCT   critical result(s) called and verbal readback obtained   from Delia Chaidez RN by SED 05/20/2024 16:46         Hypo     Occasional       Immature Grans (Abs)     0.09  Comment: Mild elevation in immature granulocytes is non specific and   can be seen in a variety of conditions including stress response,   acute inflammation, trauma and pregnancy. Correlation with other   laboratory and clinical findings is essential.         Immature  Granulocytes     0.7       Lymph #     1.7       Lymph %     13.1       MCH     32.0       MCHC     32.6       MCV     98       Mono #     0.9       Mono %     6.5       MPV     11.6       nRBC     0       Occult Blood Negative           Platelet Count     180       POCT Glucose   104         Poikilocytosis     Slight       Poly     Occasional       Potassium     3.6       PROTEIN TOTAL     5.1       RBC     1.78       RDW     11.8       Sodium     140       Stomatocytes     Present       Teardrop Cells     Occasional       Troponin I     <0.006  Comment: The reference interval for Troponin I represents the 99th percentile   cutoff   for our facility and is consistent with 3rd generation assay   performance.         WBC     13.15             EKG reviewed, ST with nonspecific T wave abnormality    Significant Imaging: I have reviewed all pertinent imaging results/findings within the past 24 hours.    CXR - no acute process, lungs clear, normal cardiac silhouette

## 2024-05-21 NOTE — ASSESSMENT & PLAN NOTE
Patient's anemia is currently uncontrolled. Has not received any PRBCs to date. Etiology likely d/t chronic blood loss  Current CBC reviewed-   Lab Results   Component Value Date    HGB 5.8 (LL) 05/21/2024    HCT 17.5 (LL) 05/21/2024     Monitor serial CBC and transfuse if patient becomes hemodynamically unstable, symptomatic or H/H drops below 7/21.    Blood transfusion ordered, 3 units PRBCs   No obvious source of bleeding identified, stool for occult blood negative, hx of peptic ulcer in the past, will consult GI  Denies use of NSAIDs or OTC medications/Pepto Bismol, etc. -- ? however home med list includes ibuprofen 600 mg PRN  Iron studies: Iron 66, TIBC 255, Folate 5.7, Ferritin 58   Will trend h/h 1 hr post transfusion and in a.m   Hemoglobin electrophoresis ordered in a.m.

## 2024-05-21 NOTE — ASSESSMENT & PLAN NOTE
Patient's anemia is currently uncontrolled. Has not received any PRBCs to date. Etiology likely d/t chronic blood loss  Current CBC reviewed-   Lab Results   Component Value Date    HGB 5.7 (LL) 05/20/2024    HCT 17.5 (LL) 05/20/2024     Monitor serial CBC and transfuse if patient becomes hemodynamically unstable, symptomatic or H/H drops below 7/21.    Blood transfusion ordered, pending  No obvious source of bleeding identified, stool for occult blood negative, hx of peptic ulcer in the past, will consult GI  Denies use of NSAIDs or OTC medications/Pepto Bismol, etc. -- ? however home med list includes ibuprofen 600 mg PRN  Keep NPO for now  Check iron studies

## 2024-05-21 NOTE — ASSESSMENT & PLAN NOTE
Treated for H pylori/peptic ulcer disease in 2015, also required blood transfusion then for anemia, thought due to increased NSAIDs  States that he has avoided NSAIDs since that time  Protonix ordered  GI consulted- Dr. Campoverde recommends outpatient follow up with [facility] as patient is hemoccult negative   H pylori stool antigen ordered, pending

## 2024-05-21 NOTE — HOSPITAL COURSE
33 year old male presented to the ED with complaints of weakness, fatigue, and near-syncopal episode.    Labs: H/H 5.8/17.5, ferritin 172, platelet count 171, BUN 22, Crit 0.8   EKG sinus tachycardia  Hemoccult negative   GI Consulted- Dr. Obrien recommends patient to follow up outpatient with [facility] due to being hemoccult negative for endoscopic evaluation. Does not need in-patient endoscopic evaluation at this time, per Dr. Campoverde.   IV Protonix switch to PO Protonix 40mg BID   H.Pylori stool antigen ordered   Patient received 3 units PRBCs   Will continue to trend hemoglobin and hematocrit in a.m.   Hemoglobin electrophoresis ordered for in the a.m.   Regular diet ordered     Patient reports he is feeling much better today, denies any dizziness, CP and SOB. Patient reports fatigue has also improved. Patient continues to deny any PICA symptoms like ingestion of dirt, mud, or paper. Patient denies any melena, arianne blood in stool, or hematochezia. Patient educated on importance of follow up with PCP and scheduling an appointment with [facility] GI services.  Jasmin Dunn faxed out clinicals to [facility] for GI appointment to get outpatient endoscopic evaluation. Patient education on importance of discontinuing use of illicit substances and tobacco cessation. Ambulatory referral placed to smoking cessation. Patient educated on importance of  follow up with PCP and GI to continue to trend labs. Patient discharges with prescription for Protonix 40mg BID for 30 days. Patient educated to avoid ASA, NSAIDS, or pepto bismol. Patient also advised to avoid fatty, fried, and spicy foods.     Patient seen and examined on the day of discharge.  All questions and concerns were addressed prior to discharge.    Face to face encounter with patient: 36

## 2024-05-21 NOTE — SUBJECTIVE & OBJECTIVE
Interval History: f/u near syncope. Patient awake and alert. NAD. Patient denies any melena or hematochezia. Patient reports he still feels very dizzy when standing. Patient denies PICA, denies taking any Pepto-Bismol, denies any NSAID use. Patient denies nausea, vomiting, or diarrhea. States last BM was 2 days ago. Will continue to trend h/h post blood transfusion completion today and in a.m. . H. Pylori stool antigen ordered, hemoglobin electrophoresis ordered. Regular diet ordered. Protonix switched to PO     Review of Systems   Constitutional:  Positive for fatigue.   Respiratory:  Positive for shortness of breath (with exertion).    Cardiovascular:  Negative for chest pain.   Gastrointestinal: Negative.  Negative for anal bleeding, blood in stool, constipation, diarrhea, nausea and vomiting.   Genitourinary: Negative.    Skin: Negative.    Neurological:  Positive for dizziness, weakness and light-headedness.     Objective:     Vital Signs (Most Recent):  Temp: 98.9 °F (37.2 °C) (05/21/24 1330)  Pulse: 71 (05/21/24 1452)  Resp: 17 (05/21/24 1330)  BP: 106/66 (05/21/24 1330)  SpO2: 100 % (05/21/24 1330) Vital Signs (24h Range):  Temp:  [97.5 °F (36.4 °C)-99.2 °F (37.3 °C)] 98.9 °F (37.2 °C)  Pulse:  [] 71  Resp:  [14-22] 17  SpO2:  [95 %-100 %] 100 %  BP: ()/(44-66) 106/66     Weight: 82.6 kg (182 lb)  Body mass index is 24.68 kg/m².    Intake/Output Summary (Last 24 hours) at 5/21/2024 1607  Last data filed at 5/21/2024 1324  Gross per 24 hour   Intake 2005.25 ml   Output --   Net 2005.25 ml         Physical Exam  Vitals and nursing note reviewed.   Constitutional:       General: He is not in acute distress.  HENT:      Mouth/Throat:      Mouth: Mucous membranes are dry.      Pharynx: Oropharynx is clear.   Eyes:      Pupils: Pupils are equal, round, and reactive to light.   Cardiovascular:      Rate and Rhythm: Normal rate and regular rhythm.      Heart sounds: No murmur heard.  Pulmonary:       Effort: Pulmonary effort is normal.      Breath sounds: Normal breath sounds. No wheezing.   Abdominal:      General: Bowel sounds are normal. There is no distension.      Palpations: Abdomen is soft.      Tenderness: There is no abdominal tenderness.   Musculoskeletal:         General: Normal range of motion.   Skin:     General: Skin is warm and dry.   Neurological:      General: No focal deficit present.      Mental Status: He is alert and oriented to person, place, and time.      Comments: Speech very low     Psychiatric:         Mood and Affect: Affect is flat.             Significant Labs: All pertinent labs within the past 24 hours have been reviewed.  Recent Lab Results  (Last 5 results in the past 24 hours)        05/21/24  0821   05/21/24  0743   05/21/24  0252   05/21/24  0226   05/20/24  1656        Unit Blood Type Code       0600  [P]                0600  [P]                0600  [P]         Unit Expiration       [id number]  [P]                [id number]  [P]                [id number]  [P]         Unit Blood Type       A NEG  [P]                A NEG  [P]                A NEG  [P]         Albumin   2.8     2.9         ALP   42             ALT   9             Anion Gap   8     6         Appearance, UA Clear               PTT       23.1  Comment: Refer to local heparin nomogram for intensity/dose specific   therapeutic   range.           AST   13             Baso #   0.02     0.03         Basophil %   0.2     0.3         Bilirubin (UA) Negative               BILIRUBIN TOTAL   0.5  Comment: For infants and newborns, interpretation of results should be based  on gestational age, weight and in agreement with clinical  observations.    Premature Infant recommended reference ranges:  Up to 24 hours.............<8.0 mg/dL  Up to 48 hours............<12.0 mg/dL  3-5 days..................<15.0 mg/dL  6-29 days.................<15.0 mg/dL               BUN   22     26         Calcium   7.7     7.9          Chloride   109     110         CO2   22     23         CODING SYSTEM       [id number]  [P]                [id number]  [P]                [id number]  [P]         Color, UA Yellow               Creatinine   0.8     0.9         Crossmatch Interpretation       Compatible  [P]                Compatible  [P]                Compatible  [P]         Differential Method   Automated     Automated         DISPENSE STATUS       ISSUED  [P]                ISSUED  [P]                ISSUED  [P]         eGFR   >60     >60         Eos #   0.0     0.0         Eos %   0.4     0.4         Ferritin     58           Folate     5.7           Glucose   88     90         Glucose, UA Negative               Gran # (ANC)   5.0     6.0         Gran %   62.1     58.9         Group & Rh       A NEG         Hematocrit   17.5  Comment: hh  result(s) called and verbal readback obtained from nuha castillo rn by [facility] 05/21/2024 08:40       15.0  Comment: Results confirmed, test repeated  HGB,HCT  critical result(s) called and verbal readback obtained   from ROXI FONG RN by [facility] 05/21/2024 02:46           Hemoglobin   5.8  Comment: hh  result(s) called and verbal readback obtained from nuha castillo rn by [facility] 05/21/2024 08:40       5.0  Comment: Results confirmed, test repeated  HGB,HCT  critical result(s) called and verbal readback obtained   from ROXI FONG RN by [facility] 05/21/2024 02:46           HIV 1/2 Ag/Ab     Negative           Immature Grans (Abs)   0.04  Comment: Mild elevation in immature granulocytes is non specific and   can be seen in a variety of conditions including stress response,   acute inflammation, trauma and pregnancy. Correlation with other   laboratory and clinical findings is essential.       0.05  Comment: Mild elevation in immature granulocytes is non specific and   can be seen in a variety of conditions including stress response,   acute inflammation, trauma and pregnancy. Correlation with other   laboratory and clinical findings  is essential.           Immature Granulocytes   0.5     0.5         INDIRECT ELIZABETH       NEG         INR       1.0  Comment: Coumadin Therapy:  2.0 - 3.0 for INR for all indicators except mechanical heart valves  and antiphospholipid syndromes which should use 2.5 - 3.5.           Iron     66           Ketones, UA 2+               Leukocyte Esterase, UA Negative               Lymph #   2.0     3.0         Lymph %   25.2     29.2         MCH   32.4     31.8         MCHC   33.1     33.3         MCV   98     96         Mono #   0.9     1.1         Mono %   11.6     10.7         MPV   11.4     11.3         NITRITE UA Negative               nRBC   1     1         Occult Blood         Negative       Blood, UA Negative               pH, UA 6.0               Phosphorus Level       2.4         Platelet Count   171     189         Potassium   3.6     3.3         Product Code       [id number]  [P]                [id number]  [P]                [id number]  [P]         PROTEIN TOTAL   4.6             Protein, UA Negative  Comment: Recommend a 24 hour urine protein or a urine   protein/creatinine ratio if globulin induced proteinuria is  clinically suspected.                 PT       11.1         RBC   1.79     1.57         RDW   12.1     11.5         Saturated Iron     26           Sodium   139     139         Specific Raymond, UA 1.025               Specimen Outdate       05/24/2024 23:59         Specimen UA Urine, Clean Catch               TIBC     255           Transferrin     172           UNIT NUMBER       [id number]  [P]                [id number]  [P]                [id number]  [P]         UROBILINOGEN UA Negative               WBC   8.01     10.12                                 [P] - Preliminary Result               Significant Imaging: I have reviewed all pertinent imaging results/findings within the past 24 hours.

## 2024-05-21 NOTE — H&P
[facility] Medicine  History & Physical    Patient Name: Magda Cota  MRN: [id number]  Patient Class: IP- Inpatient  Admission Date: 5/20/2024  Attending Physician: Carolynn Harris MD   Primary Care Provider: Alvino Jerez MD         Patient information was obtained from patient, past medical records, and ER records.     Subjective:     Principal Problem:Symptomatic anemia    Chief Complaint:   Chief Complaint   Patient presents with    near syncope     Near syncope at home, , denies pain. AAOx4.         HPI: Patient is 33-year-old male with past medical history significant for anxiety, depression, bipolar disorder, H pylori positive peptic ulcer disease (2015), tobacco abuse, polysubstance abuse, and ADHD who presented to [facility] ED with complaints of weakness, fatigue, and near-syncopal episode.  Vital signs while in ED included blood pressure as low as 64/44 (standing),  90s systolic while lying, initially tachycardic to 116 improved with fluids, saturating normally on room air, afebrile.   Lab workup showed WBC 13 K, hemoglobin 5.7, hematocrit 17.5, platelets 180, BUN 34, creatinine 0.9, albumin 2.9, normal LFTs, BNP less than 10, troponin normal.  Chest x-ray normal.  Glucose normal.  His symptoms started on Sunday night, states feeling very weak  and very tired.  He denies chest pain, shortness and breath, headache,  nausea, vomiting, abdominal pain, black or bloody stools, diarrhea, fever, chills, pain, visual changes, dysuria, cough or cold symptoms.  Rectal exam done per ED stool noted to be orange in color, no melena no hematochezia. Patient denies taking any NSAIDs, denies taking any over-the-counter medications denies taking Pepto-Bismol.  He states that he only takes prescription medications.  He denies current drug use, denies drinking alcohol.  He was given 1 L normal saline.  Hospital Medicine team was consulted for admission due to symptomatic anemia  requiring blood transfusion.    Past Medical History:   Diagnosis Date    ADHD     Anxiety and depression     Bipolar disorder, unspecified     H pylori ulcer     Peptic ulcer     Polysubstance abuse     Tobacco abuse        Past Surgical History:   Procedure Laterality Date    ESOPHAGOGASTRODUODENOSCOPY         Review of patient's allergies indicates:   Allergen Reactions    Naproxen Nausea And Vomiting       No current facility-administered medications on file prior to encounter.     Current Outpatient Medications on File Prior to Encounter   Medication Sig    busPIRone (BUSPAR) 15 MG tablet Take 15 mg by mouth 2 (two) times daily.    ibuprofen (ADVIL,MOTRIN) 600 MG tablet Take 1 tablet (600 mg total) by mouth every 6 (six) hours as needed for Pain.    QUEtiapine (SEROQUEL) 200 MG Tab Take 200 mg by mouth every evening.     Family History       Problem Relation (Age of Onset)    Diabetes Mother    Diabetes Mellitus Father    Hypertension Mother, Father          Tobacco Use    Smoking status: Every Day     Current packs/day: 1.00     Types: Cigarettes    Smokeless tobacco: Never   Substance and Sexual Activity    Alcohol use: No    Drug use: Not Currently    Sexual activity: Yes     Partners: Female     Review of Systems   Constitutional:  Positive for fatigue. Negative for chills, diaphoresis, fever and unexpected weight change.   HENT: Negative.  Negative for congestion, nosebleeds, sore throat and trouble swallowing.    Eyes: Negative.  Negative for visual disturbance.   Respiratory:  Negative for cough and shortness of breath.    Cardiovascular: Negative.  Negative for chest pain, palpitations and leg swelling.   Gastrointestinal: Negative.  Negative for abdominal distention, abdominal pain, anal bleeding, blood in stool, diarrhea, nausea, rectal pain and vomiting.   Endocrine: Negative.    Genitourinary: Negative.    Musculoskeletal: Negative.    Skin: Negative.    Allergic/Immunologic: Negative.     Neurological:  Positive for dizziness, weakness and light-headedness.        Near-syncope   Psychiatric/Behavioral: Negative.       Objective:     Vital Signs (Most Recent):  Temp: 98.7 °F (37.1 °C) (05/21/24 0134)  Pulse: 90 (05/21/24 0134)  Resp: 17 (05/21/24 0134)  BP: 108/64 (05/21/24 0134)  SpO2: 95 % (05/21/24 0134) Vital Signs (24h Range):  Temp:  [98 °F (36.7 °C)-98.7 °F (37.1 °C)] 98.7 °F (37.1 °C)  Pulse:  [] 90  Resp:  [14-22] 17  SpO2:  [95 %-100 %] 95 %  BP: ()/(44-65) 108/64     Weight: 82.6 kg (182 lb)  Body mass index is 24.68 kg/m².     Physical Exam  Constitutional:       General: He is not in acute distress.     Appearance: He is not ill-appearing, toxic-appearing or diaphoretic.   HENT:      Head: Normocephalic and atraumatic.      Nose: Nose normal.      Mouth/Throat:      Mouth: Mucous membranes are moist.      Pharynx: Oropharynx is clear.   Eyes:      Extraocular Movements: Extraocular movements intact.      Pupils: Pupils are equal, round, and reactive to light.   Cardiovascular:      Rate and Rhythm: Normal rate and regular rhythm.      Pulses: Normal pulses.      Heart sounds: Normal heart sounds.   Pulmonary:      Effort: Pulmonary effort is normal. No respiratory distress.      Breath sounds: Normal breath sounds. No wheezing or rales.   Chest:      Chest wall: No tenderness.   Abdominal:      General: Bowel sounds are normal. There is no distension.      Palpations: Abdomen is soft.      Tenderness: There is no abdominal tenderness. There is no guarding.   Genitourinary:     Rectum: Guaiac result negative.   Musculoskeletal:         General: Normal range of motion.      Cervical back: Neck supple.      Right lower leg: No edema.      Left lower leg: No edema.   Skin:     General: Skin is warm and dry.      Capillary Refill: Capillary refill takes less than 2 seconds.   Neurological:      General: No focal deficit present.      Mental Status: He is alert and oriented to  person, place, and time.      Comments: Speech is very soft   Psychiatric:         Mood and Affect: Mood normal.         Behavior: Behavior normal.              CRANIAL NERVES     CN III, IV, VI   Pupils are equal, round, and reactive to light.       Significant Labs: All pertinent labs within the past 24 hours have been reviewed.  Recent Lab Results         05/20/24  1656   05/20/24  1617   05/20/24  1612        Albumin     2.9       ALP     39       ALT     13       Anion Gap     9       Aniso     Slight       AST     17       Baso #     0.02       Basophil %     0.2       BILIRUBIN TOTAL     0.2  Comment: For infants and newborns, interpretation of results should be based  on gestational age, weight and in agreement with clinical  observations.    Premature Infant recommended reference ranges:  Up to 24 hours.............<8.0 mg/dL  Up to 48 hours............<12.0 mg/dL  3-5 days..................<15.0 mg/dL  6-29 days.................<15.0 mg/dL         BNP     <10  Comment: Values of less than 100 pg/ml are consistent with non-CHF populations.       BUN     34       Calcium     7.9       Chloride     108       CO2     23       Creatinine     0.9       Differential Method     Automated       eGFR     >60.0       Eos #     0.0       Eos %     0.2       Glucose     96       Gran # (ANC)     10.4       Gran %     79.3       Hematocrit     17.5  Comment: HGB and HCT   critical result(s) called and verbal readback obtained   from Delia Chaidez RN by [facility] 05/20/2024 16:46         Hemoglobin     5.7  Comment: HGB and HCT   critical result(s) called and verbal readback obtained   from Delia Chaidez RN by [facility] 05/20/2024 16:46         Hypo     Occasional       Immature Grans (Abs)     0.09  Comment: Mild elevation in immature granulocytes is non specific and   can be seen in a variety of conditions including stress response,   acute inflammation, trauma and pregnancy. Correlation with other   laboratory and clinical  findings is essential.         Immature Granulocytes     0.7       Lymph #     1.7       Lymph %     13.1       MCH     32.0       MCHC     32.6       MCV     98       Mono #     0.9       Mono %     6.5       MPV     11.6       nRBC     0       Occult Blood Negative           Platelet Count     180       POCT Glucose   104         Poikilocytosis     Slight       Poly     Occasional       Potassium     3.6       PROTEIN TOTAL     5.1       RBC     1.78       RDW     11.8       Sodium     140       Stomatocytes     Present       Teardrop Cells     Occasional       Troponin I     <0.006  Comment: The reference interval for Troponin I represents the 99th percentile   cutoff   for our facility and is consistent with 3rd generation assay   performance.         WBC     13.15             EKG reviewed, ST with nonspecific T wave abnormality    Significant Imaging: I have reviewed all pertinent imaging results/findings within the past 24 hours.    CXR - no acute process, lungs clear, normal cardiac silhouette    Assessment/Plan:     * Symptomatic anemia  Patient's anemia is currently uncontrolled. Has not received any PRBCs to date. Etiology likely d/t chronic blood loss  Current CBC reviewed-   Lab Results   Component Value Date    HGB 5.7 (LL) 05/20/2024    HCT 17.5 (LL) 05/20/2024     Monitor serial CBC and transfuse if patient becomes hemodynamically unstable, symptomatic or H/H drops below 7/21.    Blood transfusion ordered, pending  No obvious source of bleeding identified, stool for occult blood negative, hx of peptic ulcer in the past, will consult GI  Denies use of NSAIDs or OTC medications/Pepto Bismol, etc. -- ? however home med list includes ibuprofen 600 mg PRN  Keep NPO for now  Check iron studies      Near syncope  +orthostatic -- dropped to 64/44 when standing  Hydrated with fluid bolus  Blood transfusion pending      History of peptic ulcer disease  Treated for H pylori/peptic ulcer disease in 2015, also  required blood transfusion then for anemia, thought due to increased NSAIDs  States that he has avoided NSAIDs since that time  Protonix ordered      Tobacco abuse  Counseled on smoking cessation      Bipolar 1 disorder  Resume home medications upon discharge        VTE Risk Mitigation (From admission, onward)           Ordered     IP VTE LOW RISK PATIENT  Once         05/21/24 0203     Place sequential compression device  Until discontinued         05/21/24 0203                  Case discussed with Dr. Carolynn Doty, NP  Department of Hospital Medicine  [facility] Tele[facility] ([facility])

## 2024-05-22 VITALS
SYSTOLIC BLOOD PRESSURE: 118 MMHG | DIASTOLIC BLOOD PRESSURE: 64 MMHG | HEART RATE: 88 BPM | HEIGHT: 72 IN | BODY MASS INDEX: 24.65 KG/M2 | WEIGHT: 182 LBS | TEMPERATURE: 98 F | OXYGEN SATURATION: 100 % | RESPIRATION RATE: 18 BRPM

## 2024-05-22 LAB
ALBUMIN SERPL BCP-MCNC: 2.7 G/DL (ref 3.5–5.2)
ALP SERPL-CCNC: 41 U/L (ref 55–135)
ALT SERPL W/O P-5'-P-CCNC: 11 U/L (ref 10–44)
ANION GAP SERPL CALC-SCNC: 6 MMOL/L (ref 8–16)
AST SERPL-CCNC: 15 U/L (ref 10–40)
BASOPHILS # BLD AUTO: 0.02 K/UL (ref 0–0.2)
BASOPHILS NFR BLD: 0.3 % (ref 0–1.9)
BILIRUB SERPL-MCNC: 0.4 MG/DL (ref 0.1–1)
BUN SERPL-MCNC: 16 MG/DL (ref 6–20)
CALCIUM SERPL-MCNC: 7.7 MG/DL (ref 8.7–10.5)
CHLORIDE SERPL-SCNC: 107 MMOL/L (ref 95–110)
CO2 SERPL-SCNC: 23 MMOL/L (ref 23–29)
CREAT SERPL-MCNC: 0.8 MG/DL (ref 0.5–1.4)
DIFFERENTIAL METHOD BLD: ABNORMAL
EOSINOPHIL # BLD AUTO: 0.1 K/UL (ref 0–0.5)
EOSINOPHIL NFR BLD: 1.3 % (ref 0–8)
ERYTHROCYTE [DISTWIDTH] IN BLOOD BY AUTOMATED COUNT: 16.3 % (ref 11.5–14.5)
EST. GFR  (NO RACE VARIABLE): >60 ML/MIN/1.73 M^2
GLUCOSE SERPL-MCNC: 73 MG/DL (ref 70–110)
HCT VFR BLD AUTO: 23.3 % (ref 40–54)
HGB BLD-MCNC: 7.9 G/DL (ref 14–18)
IMM GRANULOCYTES # BLD AUTO: 0.03 K/UL (ref 0–0.04)
IMM GRANULOCYTES NFR BLD AUTO: 0.4 % (ref 0–0.5)
LYMPHOCYTES # BLD AUTO: 1.8 K/UL (ref 1–4.8)
LYMPHOCYTES NFR BLD: 25.4 % (ref 18–48)
MCH RBC QN AUTO: 31.1 PG (ref 27–31)
MCHC RBC AUTO-ENTMCNC: 33.9 G/DL (ref 32–36)
MCV RBC AUTO: 92 FL (ref 82–98)
MONOCYTES # BLD AUTO: 0.9 K/UL (ref 0.3–1)
MONOCYTES NFR BLD: 12.3 % (ref 4–15)
NEUTROPHILS # BLD AUTO: 4.3 K/UL (ref 1.8–7.7)
NEUTROPHILS NFR BLD: 60.3 % (ref 38–73)
NRBC BLD-RTO: 2 /100 WBC
PLATELET # BLD AUTO: 179 K/UL (ref 150–450)
PMV BLD AUTO: 11.2 FL (ref 9.2–12.9)
POTASSIUM SERPL-SCNC: 3.7 MMOL/L (ref 3.5–5.1)
PROT SERPL-MCNC: 4.6 G/DL (ref 6–8.4)
RBC # BLD AUTO: 2.54 M/UL (ref 4.6–6.2)
SODIUM SERPL-SCNC: 136 MMOL/L (ref 136–145)
VIT B12 SERPL-MCNC: 292 NG/L (ref 180–914)
WBC # BLD AUTO: 7.1 K/UL (ref 3.9–12.7)

## 2024-05-22 PROCEDURE — 25000003 PHARM REV CODE 250

## 2024-05-22 PROCEDURE — 36415 COLL VENOUS BLD VENIPUNCTURE: CPT

## 2024-05-22 PROCEDURE — 80053 COMPREHEN METABOLIC PANEL: CPT | Performed by: NURSE PRACTITIONER

## 2024-05-22 PROCEDURE — 85025 COMPLETE CBC W/AUTO DIFF WBC: CPT | Performed by: NURSE PRACTITIONER

## 2024-05-22 PROCEDURE — 83021 HEMOGLOBIN CHROMOTOGRAPHY: CPT

## 2024-05-22 RX ORDER — PANTOPRAZOLE SODIUM 40 MG/1
40 TABLET, DELAYED RELEASE ORAL 2 TIMES DAILY
Qty: 60 TABLET | Refills: 0 | Status: SHIPPED | OUTPATIENT
Start: 2024-05-22 | End: 2024-05-22

## 2024-05-22 RX ORDER — PANTOPRAZOLE SODIUM 40 MG/1
40 TABLET, DELAYED RELEASE ORAL 2 TIMES DAILY
Qty: 60 TABLET | Refills: 0 | Status: SHIPPED | OUTPATIENT
Start: 2024-05-22 | End: 2024-06-29

## 2024-05-22 RX ORDER — POLYETHYLENE GLYCOL 3350 17 G/17G
17 POWDER, FOR SOLUTION ORAL DAILY PRN
Qty: 510 G | Refills: 0 | Status: SHIPPED | OUTPATIENT
Start: 2024-05-22 | End: 2024-05-22

## 2024-05-22 RX ORDER — POLYETHYLENE GLYCOL 3350 17 G/17G
17 POWDER, FOR SOLUTION ORAL DAILY PRN
Qty: 510 G | Refills: 0 | Status: SHIPPED | OUTPATIENT
Start: 2024-05-22 | End: 2024-06-29

## 2024-05-22 RX ADMIN — PANTOPRAZOLE SODIUM 40 MG: 40 TABLET, DELAYED RELEASE ORAL at 09:05

## 2024-05-22 NOTE — PLAN OF CARE
Problem: Adult Inpatient Plan of Care  Goal: Plan of Care Review  Outcome: Progressing  Flowsheets (Taken 5/21/2024 6802)  Plan of Care Reviewed With: patient  Goal: Patient-Specific Goal (Individualized)  Outcome: Progressing  Goal: Absence of Hospital-Acquired Illness or Injury  Outcome: Progressing  Goal: Optimal Comfort and Wellbeing  Outcome: Progressing  Goal: Readiness for Transition of Care  Outcome: Progressing     Problem: Infection  Goal: Absence of Infection Signs and Symptoms  Outcome: Progressing     Problem: Pain Acute  Goal: Optimal Pain Control and Function  Outcome: Progressing

## 2024-05-22 NOTE — ASSESSMENT & PLAN NOTE
Patient's anemia is currently uncontrolled. Has not received any PRBCs to date. Etiology likely d/t chronic blood loss  Current CBC reviewed-   Lab Results   Component Value Date    HGB 7.9 (L) 05/22/2024    HCT 23.3 (L) 05/22/2024     Monitor serial CBC and transfuse if patient becomes hemodynamically unstable, symptomatic or H/H drops below 7/21.    Blood transfusion ordered, 3 units PRBCs   No obvious source of bleeding identified, stool for occult blood negative, hx of peptic ulcer in the past, will consult GI  Denies use of NSAIDs or OTC medications/Pepto Bismol, etc. -- ? however home med list includes ibuprofen 600 mg PRN  Iron studies: Iron 66, TIBC 255, Folate 5.7, Ferritin 58   Will trend h/h 1 hr post transfusion and in a.m   Hemoglobin electrophoresis ordered in a.m.   H/h stable for d/c   Patient educated on importance of follow up with PCP and scheduling an appointment with [facility] GI services.  Jasmin Dunn faxed out clinicals to [facility] for GI appointment to get outpatient endoscopic evaluation.

## 2024-05-22 NOTE — PLAN OF CARE
[facility] Tele[facility] (Hospital)  Discharge Final Note    Primary Care Provider: Alvino Jerez MD    Expected Discharge Date: 5/22/2024    Final Discharge Note (most recent)       Final Note - 05/22/24 1255          Final Note    Assessment Type Final Discharge Note (P)      Anticipated Discharge Disposition Home or Self Care (P)         Post-Acute Status    Discharge Delays None known at this time (P)                      Important Message from Medicare             Contact Info       Alvino Jerez MD   Specialty: Family Medicine   Relationship: PCP - [address] [address]   Phone: [phone]       Next Steps: Schedule an appointment as soon as possible for a visit    Instructions: Follow up with PCP within a week    [facility]    [facility] [address]   Phone: [phone]       Next Steps: Schedule an appointment as soon as possible for a visit    Instructions: Call number to schedule an appointment to  Gastroenterology services

## 2024-05-22 NOTE — ASSESSMENT & PLAN NOTE
Counseled on smoking cessation  Ambulatory referral placed to smoking cessation, patient refused patch at time of discharge

## 2024-05-22 NOTE — DISCHARGE SUMMARY
[facility] Medicine  Discharge Summary      Patient Name: Magda Cota  MRN: [id number]  EMELY: [id number]  Patient Class: IP- Inpatient  Admission Date: 5/20/2024  Hospital Length of Stay: 1 days  Discharge Date and Time:  05/22/2024 12:54 PM  Attending Physician: Kera Romero MD   Discharging Provider: Jazmine Philippe NP  Primary Care Provider: Alvino Jerez MD    Primary Care Team: Networked reference to record PCT     HPI:   Patient is 33-year-old male with past medical history significant for anxiety, depression, bipolar disorder, H pylori positive peptic ulcer disease (2015), tobacco abuse, polysubstance abuse, and ADHD who presented to [facility] ED with complaints of weakness, fatigue, and near-syncopal episode.  Vital signs while in ED included blood pressure as low as 64/44 (standing),  90s systolic while lying, initially tachycardic to 116 improved with fluids, saturating normally on room air, afebrile.   Lab workup showed WBC 13 K, hemoglobin 5.7, hematocrit 17.5, platelets 180, BUN 34, creatinine 0.9, albumin 2.9, normal LFTs, BNP less than 10, troponin normal.  Chest x-ray normal.  Glucose normal.  His symptoms started on Sunday night, states feeling very weak  and very tired.  He denies chest pain, shortness and breath, headache,  nausea, vomiting, abdominal pain, black or bloody stools, diarrhea, fever, chills, pain, visual changes, dysuria, cough or cold symptoms.  Rectal exam done per ED stool noted to be orange in color, no melena no hematochezia. Patient denies taking any NSAIDs, denies taking any over-the-counter medications denies taking Pepto-Bismol.  He states that he only takes prescription medications.  He denies current drug use, denies drinking alcohol.  He was given 1 L normal saline.  Hospital Medicine team was consulted for admission due to symptomatic anemia requiring blood transfusion.    * No surgery found *      Hospital Course:   33  year old male presented to the ED with complaints of weakness, fatigue, and near-syncopal episode.    Labs: H/H 5.8/17.5, ferritin 172, platelet count 171, BUN 22, Crit 0.8   EKG sinus tachycardia  Hemoccult negative   GI Consulted- Dr. Obrien recommends patient to follow up outpatient with [facility] due to being hemoccult negative for endoscopic evaluation. Does not need in-patient endoscopic evaluation at this time, per Dr. Campoverde.   IV Protonix switch to PO Protonix 40mg BID   H.Pylori stool antigen ordered   Patient received 3 units PRBCs   Will continue to trend hemoglobin and hematocrit in a.m.   Hemoglobin electrophoresis ordered for in the a.m.   Regular diet ordered     Patient reports he is feeling much better today, denies any dizziness, CP and SOB. Patient reports fatigue has also improved. Patient continues to deny any PICA symptoms like ingestion of dirt, mud, or paper. Patient denies any melena, arianne blood in stool, or hematochezia. Patient educated on importance of follow up with PCP and scheduling an appointment with [facility] GI services.  Jasmin Dunn faxed out clinicals to [facility] for GI appointment to get outpatient endoscopic evaluation. Patient education on importance of discontinuing use of illicit substances and tobacco cessation. Ambulatory referral placed to smoking cessation. Patient educated on importance of  follow up with PCP and GI to continue to trend labs. Patient discharges with prescription for Protonix 40mg BID for 30 days. Patient educated to avoid ASA, NSAIDS, or pepto bismol. Patient also advised to avoid fatty, fried, and spicy foods.     Patient seen and examined on the day of discharge.  All questions and concerns were addressed prior to discharge.    Face to face encounter with patient: 38      Goals of Care Treatment Preferences:  Code Status: Full Code      Consults:   Consults (From admission, onward)          Status Ordering Provider     Inpatient consult to  Gastroenterology  Once        Provider:  Deandra Campoverde MD    Acknowledged GARY GILMORE            Oncology  * Symptomatic anemia  Patient's anemia is currently uncontrolled. Has not received any PRBCs to date. Etiology likely d/t chronic blood loss  Current CBC reviewed-   Lab Results   Component Value Date    HGB 7.9 (L) 05/22/2024    HCT 23.3 (L) 05/22/2024     Monitor serial CBC and transfuse if patient becomes hemodynamically unstable, symptomatic or H/H drops below 7/21.    Blood transfusion ordered, 3 units PRBCs   No obvious source of bleeding identified, stool for occult blood negative, hx of peptic ulcer in the past, will consult GI  Denies use of NSAIDs or OTC medications/Pepto Bismol, etc. -- ? however home med list includes ibuprofen 600 mg PRN  Iron studies: Iron 66, TIBC 255, Folate 5.7, Ferritin 58   Will trend h/h 1 hr post transfusion and in a.m   Hemoglobin electrophoresis ordered in a.m.   H/h stable for d/c   Patient educated on importance of follow up with PCP and scheduling an appointment with [facility] GI services. raymon Dunn faxed out clinicals to [facility] for GI appointment to get outpatient endoscopic evaluation.      GI  History of peptic ulcer disease  Treated for H pylori/peptic ulcer disease in 2015, also required blood transfusion then for anemia, thought due to increased NSAIDs  States that he has avoided NSAIDs since that time  Protonix ordered  GI consulted- Dr. Campoverde recommends outpatient follow up with [facility] as patient is hemoccult negative   H pylori stool antigen ordered, pending   Patient educated on importance of follow up with PCP and scheduling an appointment with [facility] GI services. Social raymon Dunn faxed out clinicals to [facility] for GI appointment to get outpatient endoscopic evaluation.     Other  Tobacco abuse  Counseled on smoking cessation  Ambulatory referral placed to smoking cessation, patient refused patch at time of discharge        Bipolar 1 disorder  Resume home medications upon discharge  Patient educated on importance of follow up appointment with PCP   Patient education on importance of discontinuing use of illicit substances and tobacco cessation. Ambulatory referral placed to smoking cessation      Near syncope  +orthostatic -- dropped to 64/44 when standing  Hydrated with fluid bolus  Blood transfusion, 3 units PRBCs today   Will trend h/h 1hr post transfusion and in a.m.   H/h improved post transfusion, stable for d/c  Patient reports improvement in fatigue and dizziness, stating he feels much better       Final Active Diagnoses:    Diagnosis Date Noted POA    PRINCIPAL PROBLEM:  Symptomatic anemia [D64.9] 05/21/2024 Yes    Near syncope [R55] 05/21/2024 Yes    Bipolar 1 disorder [F31.9] 05/21/2024 Yes    History of peptic ulcer disease [Z87.11] 05/21/2024 Not Applicable    Tobacco abuse [Z72.0] 05/21/2024 Yes      Problems Resolved During this Admission:       Discharged Condition: good    Disposition: Home or Self Care    Follow Up:   Follow-up Information       Alvino Jerez MD. Schedule an appointment as soon as possible for a visit.    Specialty: Family Medicine  Why: Follow up with PCP within a week  Contact information:  [address] [place] [address] [phone]               Replaced by [facility]. Schedule an appointment as soon as possible for a visit.    Why: Call number to schedule an appointment to  Gastroenterology services  Contact information:  [address] [facility] [place] [address] [phone]                           Patient Instructions:      Ambulatory referral/consult to Smoking Cessation Program   Standing Status: Future   Referral Priority: Routine Referral Type: Consultation   Referral Reason: Specialty Services Required   Requested Specialty: CTTS   Number of Visits Requested: 1     Diet Adult Regular     Activity as tolerated  "      Significant Diagnostic Studies: Labs: BMP:   Recent Labs   Lab 05/21/24  0226 05/21/24  0743 05/22/24  0508   GLU 90 88 73    139 136   K 3.3* 3.6 3.7    109 107   CO2 23 22* 23   BUN 26* 22* 16   CREATININE 0.9 0.8 0.8   CALCIUM 7.9* 7.7* 7.7*   , CMP   Recent Labs   Lab 05/20/24  1612 05/21/24  0226 05/21/24  0743 05/22/24  0508    139 139 136   K 3.6 3.3* 3.6 3.7    110 109 107   CO2 23 23 22* 23   GLU 96 90 88 73   BUN 34* 26* 22* 16   CREATININE 0.9 0.9 0.8 0.8   CALCIUM 7.9* 7.9* 7.7* 7.7*   PROT 5.1*  --  4.6* 4.6*   ALBUMIN 2.9* 2.9* 2.8* 2.7*   BILITOT 0.2  --  0.5 0.4   ALKPHOS 39*  --  42* 41*   AST 17  --  13 15   ALT 13  --  9* 11   ANIONGAP 9 6* 8 6*   , CBC   Recent Labs   Lab 05/21/24  0226 05/21/24  0743 05/21/24  1659 05/22/24  0508   WBC 10.12 8.01  --  7.10   HGB 5.0* 5.8* 7.9* 7.9*   HCT 15.0* 17.5* 23.4* 23.3*    171  --  179   , INR   Lab Results   Component Value Date    INR 1.0 05/21/2024   , Lipid Panel No results found for: "CHOL", "HDL", "LDLCALC", "TRIG", "CHOLHDL", Troponin   Recent Labs   Lab 05/20/24  1612   TROPONINI <0.006   , and All labs within the past 24 hours have been reviewed    Pending Diagnostic Studies:       Procedure Component Value Units Date/Time    Hemoglobin Electrophoresis [place], Blood [[id number]] Collected: 05/22/24 0508    Order Status: Sent Lab Status: In process Updated: 05/22/24 1122    Specimen: Blood     Vitamin B12 Deficiency Panel [[id number]] Collected: 05/21/24 0252    Order Status: Sent Lab Status: In process Updated: 05/21/24 0924    Specimen: Blood            Medications:  Reconciled Home Medications:      Medication List        START taking these medications      pantoprazole 40 MG tablet  Commonly known as: PROTONIX  Take 1 tablet (40 mg total) by mouth 2 (two) times daily.     polyethylene glycol 17 gram/dose powder  Commonly known as: GLYCOLAX  Take 17 g by mouth daily as needed for Constipation.        "     CONTINUE taking these medications      busPIRone 15 MG tablet  Commonly known as: BUSPAR  Take 15 mg by mouth 2 (two) times daily.     QUEtiapine 200 MG Tab  Commonly known as: SEROQUEL  Take 200 mg by mouth every evening.            STOP taking these medications      ibuprofen 600 MG tablet  Commonly known as: ADVIL,MOTRIN              Indwelling Lines/Drains at time of discharge:   Lines/Drains/Airways       None                   Time spent on the discharge of patient: 55 minutes     The patient's case has been reviewed by my supervising physician.   Supervising physician will provide additional orders and recommendations at his/her discretion.  Please see supervising physicians documentation and/or orders for further details.      Jazmine Philippe NP  Department of Hospital Medicine  [facility] ([facility])

## 2024-05-22 NOTE — ASSESSMENT & PLAN NOTE
+orthostatic -- dropped to 64/44 when standing  Hydrated with fluid bolus  Blood transfusion, 3 units PRBCs today   Will trend h/h 1hr post transfusion and in a.m.   H/h improved post transfusion, stable for d/c  Patient reports improvement in fatigue and dizziness, stating he feels much better

## 2024-05-22 NOTE — ASSESSMENT & PLAN NOTE
Resume home medications upon discharge  Patient educated on importance of follow up appointment with PCP   Patient education on importance of discontinuing use of illicit substances and tobacco cessation. Ambulatory referral placed to smoking cessation

## 2024-05-22 NOTE — ASSESSMENT & PLAN NOTE
Treated for H pylori/peptic ulcer disease in 2015, also required blood transfusion then for anemia, thought due to increased NSAIDs  States that he has avoided NSAIDs since that time  Protonix ordered  GI consulted- Dr. Campoverde recommends outpatient follow up with [facility] as patient is hemoccult negative   H pylori stool antigen ordered, pending   Patient educated on importance of follow up with PCP and scheduling an appointment with [facility] GI services.  Jasmin Dunn faxed out clinicals to [facility] for GI appointment to get outpatient endoscopic evaluation.

## 2024-05-24 LAB
HB ELECTROPHORESIS INTERP CANCEL: NORMAL
HB ELECTROPHORESIS INTERPRETATION: NORMAL
HGB A MFR BLD ELPH: 97.5 % (ref 95.8–98)
HGB A2 MFR BLD: 2.5 % (ref 2–3.3)
HGB A2+XXX MFR BLD ELPH: NORMAL %
HGB F MFR BLD: 0 % (ref 0–0.9)
HGB XXX MFR BLD ELPH: NORMAL %
HPLC HB VARIANT: NORMAL
METHYLMALONATE SERPL-SCNC: 0.12 NMOL/ML